# Patient Record
Sex: MALE | Race: WHITE | NOT HISPANIC OR LATINO | Employment: FULL TIME | URBAN - METROPOLITAN AREA
[De-identification: names, ages, dates, MRNs, and addresses within clinical notes are randomized per-mention and may not be internally consistent; named-entity substitution may affect disease eponyms.]

---

## 2017-05-05 RX ORDER — ALLOPURINOL 100 MG/1
100 TABLET ORAL DAILY
Qty: 90 TAB | Refills: 3 | Status: SHIPPED | OUTPATIENT
Start: 2017-05-05 | End: 2018-04-26 | Stop reason: SDUPTHER

## 2017-11-14 ENCOUNTER — OFFICE VISIT (OUTPATIENT)
Dept: MEDICAL GROUP | Facility: PHYSICIAN GROUP | Age: 57
End: 2017-11-14
Payer: COMMERCIAL

## 2017-11-14 VITALS
TEMPERATURE: 98.9 F | RESPIRATION RATE: 16 BRPM | HEIGHT: 67 IN | OXYGEN SATURATION: 96 % | DIASTOLIC BLOOD PRESSURE: 70 MMHG | BODY MASS INDEX: 34.37 KG/M2 | HEART RATE: 87 BPM | SYSTOLIC BLOOD PRESSURE: 116 MMHG | WEIGHT: 219 LBS

## 2017-11-14 DIAGNOSIS — Z23 NEED FOR VACCINATION: ICD-10-CM

## 2017-11-14 DIAGNOSIS — M1A.09X0 IDIOPATHIC CHRONIC GOUT OF MULTIPLE SITES WITHOUT TOPHUS: ICD-10-CM

## 2017-11-14 DIAGNOSIS — Z00.00 WELL ADULT EXAM: ICD-10-CM

## 2017-11-14 DIAGNOSIS — E66.9 OBESITY (BMI 30-39.9): ICD-10-CM

## 2017-11-14 PROCEDURE — 99396 PREV VISIT EST AGE 40-64: CPT | Mod: 25 | Performed by: FAMILY MEDICINE

## 2017-11-14 PROCEDURE — 90686 IIV4 VACC NO PRSV 0.5 ML IM: CPT | Performed by: FAMILY MEDICINE

## 2017-11-14 PROCEDURE — 90471 IMMUNIZATION ADMIN: CPT | Performed by: FAMILY MEDICINE

## 2017-11-14 NOTE — ASSESSMENT & PLAN NOTE
Ongoing issue. Patient continues on allopurinol 100 mg daily and as result has not had a gout attack in over one year.

## 2017-11-14 NOTE — ASSESSMENT & PLAN NOTE
Ongoing issue. Patient has worked diligently to eat healthy and exercise and has lost weight since his last visit. As a result he has improved his BMI to 34 from previous level of 41.

## 2017-11-14 NOTE — PROGRESS NOTES
"Subjective:   Sheng Joel is a 57 y.o. male here today for Annual physical, obesity, gout    Obesity (BMI 30-39.9)  Ongoing issue. Patient has worked diligently to eat healthy and exercise and has lost weight since his last visit. As a result he has improved his BMI to 34 from previous level of 41.    Chronic idiopathic gout of multiple sites  Ongoing issue. Patient continues on allopurinol 100 mg daily and as result has not had a gout attack in over one year.         Current medicines (including changes today)  Current Outpatient Prescriptions   Medication Sig Dispense Refill   • allopurinol (ZYLOPRIM) 100 MG Tab Take 1 Tab by mouth every day. 90 Tab 3   • Multiple Vitamins-Minerals (MULTI FOR HIM) Cap Take  by mouth.     • colchicine (COLCRYS) 0.6 MG Tab Take 1 Tab by mouth every day. 30 Tab 3     No current facility-administered medications for this visit.      He  has a past medical history of Gout and Pneumonia.    ROS   No chest pain, no shortness of breath, no abdominal pain       Objective:     Blood pressure 116/70, pulse 87, temperature 37.2 °C (98.9 °F), resp. rate 16, height 1.702 m (5' 7\"), weight 99.3 kg (219 lb), SpO2 96 %. Body mass index is 34.3 kg/m².   Physical Exam:  Alert, oriented in no acute distress.  Eye contact is good, speech goal directed, affect calm  HEENT: conjunctiva non-injected, sclera non-icteric.  Pinna normal. TM pearly gray.   Oral mucous membranes pink and moist with no lesions.  Neck No adenopathy or masses in the neck or supraclavicular regions.  Lungs: clear to auscultation bilaterally with good excursion.  CV: regular rate and rhythm.  Abdomen: soft, nontender, No CVAT  Ext: no edema, color normal, vascularity normal, temperature normal  Neuro: Cranial nerves II through XII grossly intact      Assessment and Plan:   The following treatment plan was discussed     1. Well adult exam  COMP METABOLIC PANEL    LIPID PROFILE    VITAMIN D,25 HYDROXY    No acute findings " on exam; chart reviewed and updated with the patient. Patient sent for age-appropriate screening labs; monitor for results   2. Obesity (BMI 30-39.9)      Improved. Continue healthy lifestyle; monitor   3. Idiopathic chronic gout of multiple sites without tophus      Stable. Continue current medications; monitor   4. Need for vaccination  Flu Quad Inj >3 Year Pre-Filled PF    Age appropriate immunization provided; patient tolerated procedure well.       Followup: Return in about 1 year (around 11/14/2018) for annual physical, Short.

## 2017-11-16 ENCOUNTER — HOSPITAL ENCOUNTER (OUTPATIENT)
Dept: LAB | Facility: MEDICAL CENTER | Age: 57
End: 2017-11-16
Attending: FAMILY MEDICINE
Payer: COMMERCIAL

## 2017-11-16 DIAGNOSIS — Z00.00 WELL ADULT EXAM: ICD-10-CM

## 2017-11-16 LAB
25(OH)D3 SERPL-MCNC: 29 NG/ML (ref 30–100)
ALBUMIN SERPL BCP-MCNC: 4.1 G/DL (ref 3.2–4.9)
ALBUMIN/GLOB SERPL: 1.5 G/DL
ALP SERPL-CCNC: 43 U/L (ref 30–99)
ALT SERPL-CCNC: 18 U/L (ref 2–50)
ANION GAP SERPL CALC-SCNC: 9 MMOL/L (ref 0–11.9)
AST SERPL-CCNC: 18 U/L (ref 12–45)
BILIRUB SERPL-MCNC: 0.8 MG/DL (ref 0.1–1.5)
BUN SERPL-MCNC: 15 MG/DL (ref 8–22)
CALCIUM SERPL-MCNC: 9.1 MG/DL (ref 8.5–10.5)
CHLORIDE SERPL-SCNC: 106 MMOL/L (ref 96–112)
CHOLEST SERPL-MCNC: 180 MG/DL (ref 100–199)
CO2 SERPL-SCNC: 24 MMOL/L (ref 20–33)
CREAT SERPL-MCNC: 1.07 MG/DL (ref 0.5–1.4)
GFR SERPL CREATININE-BSD FRML MDRD: >60 ML/MIN/1.73 M 2
GLOBULIN SER CALC-MCNC: 2.7 G/DL (ref 1.9–3.5)
GLUCOSE SERPL-MCNC: 86 MG/DL (ref 65–99)
HDLC SERPL-MCNC: 64 MG/DL
LDLC SERPL CALC-MCNC: 104 MG/DL
POTASSIUM SERPL-SCNC: 3.9 MMOL/L (ref 3.6–5.5)
PROT SERPL-MCNC: 6.8 G/DL (ref 6–8.2)
SODIUM SERPL-SCNC: 139 MMOL/L (ref 135–145)
TRIGL SERPL-MCNC: 62 MG/DL (ref 0–149)

## 2017-11-16 PROCEDURE — 80061 LIPID PANEL: CPT

## 2017-11-16 PROCEDURE — 80053 COMPREHEN METABOLIC PANEL: CPT

## 2017-11-16 PROCEDURE — 36415 COLL VENOUS BLD VENIPUNCTURE: CPT

## 2017-11-16 PROCEDURE — 82306 VITAMIN D 25 HYDROXY: CPT

## 2018-01-16 ENCOUNTER — TELEPHONE (OUTPATIENT)
Dept: MEDICAL GROUP | Facility: PHYSICIAN GROUP | Age: 58
End: 2018-01-16

## 2018-01-16 DIAGNOSIS — Z30.09 VASECTOMY EVALUATION: ICD-10-CM

## 2018-03-12 ENCOUNTER — TELEPHONE (OUTPATIENT)
Dept: MEDICAL GROUP | Facility: PHYSICIAN GROUP | Age: 58
End: 2018-03-12

## 2018-03-12 NOTE — TELEPHONE ENCOUNTER
VOICEMAIL  1. Caller Name: Sheng Joel                        Call Back Number: [phone] (home)       2. Message: patient requesting an order for Cialis for vacation has younger girlfriend he wants to keep up with her.    3. Patient approves office to leave a detailed voicemail/MyChart message: N\A

## 2018-03-13 ENCOUNTER — PATIENT MESSAGE (OUTPATIENT)
Dept: MEDICAL GROUP | Facility: PHYSICIAN GROUP | Age: 58
End: 2018-03-13

## 2018-03-13 RX ORDER — TADALAFIL 5 MG/1
5 TABLET ORAL PRN
Qty: 10 TAB | Refills: 3 | Status: SHIPPED | OUTPATIENT
Start: 2018-03-13 | End: 2018-05-05 | Stop reason: SDUPTHER

## 2018-04-26 RX ORDER — ALLOPURINOL 100 MG/1
TABLET ORAL
Qty: 90 TAB | Refills: 0 | Status: SHIPPED | OUTPATIENT
Start: 2018-04-26 | End: 2018-08-02 | Stop reason: SDUPTHER

## 2018-07-12 ENCOUNTER — TELEPHONE (OUTPATIENT)
Dept: MEDICAL GROUP | Facility: PHYSICIAN GROUP | Age: 58
End: 2018-07-12

## 2018-07-12 NOTE — TELEPHONE ENCOUNTER
MEDICATION PRIOR AUTHORIZATION NEEDED:    1. Name of Medication: CIALIS 5 MG tablet    2. Requested By (Name of Pharmacy): [facility]     3. Is insurance on file current? Yes    4. What is the name & phone number of the 3rd party payor?   Cover My Med  Key: [id number]  Pt Last Name: Wisam CHAPARROB 1960

## 2018-08-27 RX ORDER — TADALAFIL 5 MG
TABLET ORAL
Qty: 10 TAB | Refills: 3 | Status: SHIPPED | OUTPATIENT
Start: 2018-08-27 | End: 2018-12-03

## 2018-12-03 RX ORDER — TADALAFIL 5 MG/1
TABLET ORAL
Qty: 10 TAB | Refills: 0 | Status: SHIPPED | OUTPATIENT
Start: 2018-12-03 | End: 2019-01-03

## 2018-12-03 NOTE — TELEPHONE ENCOUNTER
Requested Prescriptions     Pending Prescriptions Disp Refills   • tadalafil (CIALIS) 5 MG tablet 10 Tab 0     Sig: TAKE 1 TABLET BY MOUTH ONCE DAILY AS NEEDED FOR  ERECTILE  DYSFUNCTION  *  MAX  OF  1  PER  DAY       ARAVIND Souza.

## 2019-01-03 ENCOUNTER — OFFICE VISIT (OUTPATIENT)
Dept: MEDICAL GROUP | Facility: PHYSICIAN GROUP | Age: 59
End: 2019-01-03
Payer: COMMERCIAL

## 2019-01-03 VITALS
HEART RATE: 83 BPM | TEMPERATURE: 97.9 F | SYSTOLIC BLOOD PRESSURE: 126 MMHG | OXYGEN SATURATION: 96 % | HEIGHT: 67 IN | DIASTOLIC BLOOD PRESSURE: 84 MMHG | WEIGHT: 262 LBS | BODY MASS INDEX: 41.12 KG/M2

## 2019-01-03 DIAGNOSIS — Z23 NEED FOR VACCINATION: ICD-10-CM

## 2019-01-03 DIAGNOSIS — E55.9 VITAMIN D DEFICIENCY: ICD-10-CM

## 2019-01-03 DIAGNOSIS — E66.01 CLASS 3 SEVERE OBESITY WITHOUT SERIOUS COMORBIDITY WITH BODY MASS INDEX (BMI) OF 40.0 TO 44.9 IN ADULT, UNSPECIFIED OBESITY TYPE (HCC): ICD-10-CM

## 2019-01-03 DIAGNOSIS — M1A.09X0 IDIOPATHIC CHRONIC GOUT OF MULTIPLE SITES WITHOUT TOPHUS: ICD-10-CM

## 2019-01-03 DIAGNOSIS — N52.8 OTHER MALE ERECTILE DYSFUNCTION: ICD-10-CM

## 2019-01-03 PROBLEM — E66.813 CLASS 3 SEVERE OBESITY WITHOUT SERIOUS COMORBIDITY WITH BODY MASS INDEX (BMI) OF 40.0 TO 44.9 IN ADULT (HCC): Status: ACTIVE | Noted: 2017-11-14

## 2019-01-03 PROCEDURE — 99214 OFFICE O/P EST MOD 30 MIN: CPT | Mod: 25 | Performed by: NURSE PRACTITIONER

## 2019-01-03 PROCEDURE — 90686 IIV4 VACC NO PRSV 0.5 ML IM: CPT | Performed by: NURSE PRACTITIONER

## 2019-01-03 PROCEDURE — 90471 IMMUNIZATION ADMIN: CPT | Performed by: NURSE PRACTITIONER

## 2019-01-03 PROCEDURE — 90472 IMMUNIZATION ADMIN EACH ADD: CPT | Performed by: NURSE PRACTITIONER

## 2019-01-03 PROCEDURE — 90715 TDAP VACCINE 7 YRS/> IM: CPT | Performed by: NURSE PRACTITIONER

## 2019-01-03 RX ORDER — SILDENAFIL CITRATE 20 MG/1
20-80 TABLET ORAL
Qty: 30 TAB | Refills: 3 | Status: SHIPPED | OUTPATIENT
Start: 2019-01-03 | End: 2019-10-22

## 2019-01-03 RX ORDER — SILDENAFIL CITRATE 20 MG/1
20 TABLET ORAL 3 TIMES DAILY
COMMUNITY
End: 2019-01-03

## 2019-01-03 ASSESSMENT — PATIENT HEALTH QUESTIONNAIRE - PHQ9: CLINICAL INTERPRETATION OF PHQ2 SCORE: 0

## 2019-01-03 NOTE — LETTER
[facility]  AYESHA SouzaREllieN.  [address]  [place] [address]  Fax: [phone]   Authorization for Release/Disclosure of   Protected Health Information   Name: SHENG MILLS : 1960 SSN: [id number]   Address: [address] [facility] [id number]  [place] [address] Phone:    [phone] (home)    I authorize the entity listed below to release/disclose the PHI below to:   [facility]/LUIS FERNANDO SouzaP.R.TERRIE. and ARAVIND Souza.   Provider or Entity Name:  [facility]   Address   City, State, Zip   Phone:      Fax:     Reason for request: continuity of care   Information to be released:    [X] LAST COLONOSCOPY,  including any PATH REPORT and follow-up  [  ] LAST FIT/COLOGUARD RESULT [  ] LAST DEXA  [  ] LAST MAMMOGRAM  [  ] LAST PAP  [  ] LAST LABS [  ] RETINA EXAM REPORT  [  ] IMMUNIZATION RECORDS  [  ] Release all info      [  ] Check here and initial the line next to each item to release ALL health information INCLUDING  _____ Care and treatment for drug and / or alcohol abuse  _____ HIV testing, infection status, or AIDS  _____ Genetic Testing    DATES OF SERVICE OR TIME PERIOD TO BE DISCLOSED: _____________  I understand and acknowledge that:  * This Authorization may be revoked at any time by you in writing, except if your health information has already been used or disclosed.  * Your health information that will be used or disclosed as a result of you signing this authorization could be re-disclosed by the recipient. If this occurs, your re-disclosed health information may no longer be protected by State or Federal laws.  * You may refuse to sign this Authorization. Your refusal will not affect your ability to obtain treatment.  * This Authorization becomes effective upon signing and will  on (date) __________.      If no date is indicated, this Authorization will  one (1) year from the signature date.    Name: Sheng Mnea  Wisam    Signature:   Date:     1/3/2019       PLEASE FAX REQUESTED RECORDS BACK TO: [phone]

## 2019-01-03 NOTE — ASSESSMENT & PLAN NOTE
This is a chronic problem for the patient that is uncontrolled.  The patient's last vitamin D level was 29 on 11/16/2017.  The patient is not taking any vitamin D supplements.

## 2019-01-03 NOTE — LETTER
[facility]  AYESHA SouzaREllieN.  [address]  [place] [address]  Fax: [phone]   Authorization for Release/Disclosure of   Protected Health Information   Name: SHENG JOEL : 1960 SSN: [id number]   Address: [address] [facility] [address]  [place] [address] Phone:    [phone] (home)    I authorize the entity listed below to release/disclose the PHI below to:   [facility]/LUIS FERNANDO SouzaP.R.TERRIE. and LEONELA Souza   Provider or Entity Name:  [facility]   Address   City, State, Zip   Phone:      Fax:     Reason for request: continuity of care   Information to be released:    [X] LAST COLONOSCOPY,  including any PATH REPORT and follow-up  [  ] LAST FIT/COLOGUARD RESULT [  ] LAST DEXA  [  ] LAST MAMMOGRAM  [  ] LAST PAP  [  ] LAST LABS [  ] RETINA EXAM REPORT  [  ] IMMUNIZATION RECORDS  [  ] Release all info      [  ] Check here and initial the line next to each item to release ALL health information INCLUDING  _____ Care and treatment for drug and / or alcohol abuse  _____ HIV testing, infection status, or AIDS  _____ Genetic Testing    DATES OF SERVICE OR TIME PERIOD TO BE DISCLOSED: _____________  I understand and acknowledge that:  * This Authorization may be revoked at any time by you in writing, except if your health information has already been used or disclosed.  * Your health information that will be used or disclosed as a result of you signing this authorization could be re-disclosed by the recipient. If this occurs, your re-disclosed health information may no longer be protected by State or Federal laws.  * You may refuse to sign this Authorization. Your refusal will not affect your ability to obtain treatment.  * This Authorization becomes effective upon signing and will  on (date) __________.      If no date is indicated, this Authorization will  one (1) year from the signature date.    Name: Sheng Joel    Signature:   Date:          1/3/2019       PLEASE FAX REQUESTED RECORDS BACK TO: [phone]

## 2019-01-03 NOTE — PROGRESS NOTES
"CC:   Chief Complaint   Patient presents with   • Establish Care   • Gout   • Erectile Dysfunction       HISTORY OF THE PRESENT ILLNESS: Patient is a 58 y.o. male. This pleasant patient is here today to establish care and discuss multiple issues as listed below.    Health Maintenance: Reviewed, patient reports that he had a colonoscopy at age 53 and was recommended to return in 10 years, records requested.  Tdap and influenza updated today.    Other male erectile dysfunction  This is a chronic problem for the patient that is managed with medication.  The patient reports that he has a new girlfriend that is 19 years old and lives in the [facility].  The patient has been traveling to see her for a few weeks at a time every couple months.  Patient reports that he is extremely sexually active with his new girlfriend and requires the medication \"to keep up\".  Patient had previously been taking Cialis but due to cost of $128 for 10 pills the patient is requesting a change to sildenafil.  The patient denies any side effects while taking medications.  The patient denies chest pain, shortness of breath, headaches, dizziness, blurry vision with medication use.  The patient's blood pressure today is 126/84 with heart rate of 83.    Class 3 severe obesity without serious comorbidity with body mass index (BMI) of 40.0 to 44.9 in adult (HCC)  This is a chronic problem for the patient that is uncontrolled.  The patient's weight today is 262 pounds with a BMI of 41.04.  Patient reports that one year ago he had his weight down to 212 pounds.  Over the last year he has been \"lazy\" in regards to his diet and exercise.  The patient has cut back on his alcohol intake as he had a DUI 6 months ago and is on probation for this.  The patient is planning another trip to the [facility] to see his girlfriend in February 2019 and plans to start eating healthy and return to the gym to use the treadmill in hopes of losing some weight before " this vacation.    Chronic idiopathic gout of multiple sites  This is a chronic problem for the patient that is controlled with allopurinol 100 mg/day.  The patient reports that before starting this medication he was having gout flares monthly.  Since starting the medication he has had 2 flares in the last 2 years.  Patient does have a prescription for colchicine as needed, but has not needed it.  Patient also reports that he went to [place] and bought an over-the-counter medication for gout that he keeps on hand in case of a flare, picture of medication appears to be indomethacin.    Vitamin D deficiency  This is a chronic problem for the patient that is uncontrolled.  The patient's last vitamin D level was 29 on 11/16/2017.  The patient is not taking any vitamin D supplements.    Allergies: Patient has no known allergies.    Current Outpatient Prescriptions Ordered in [facility]   Medication Sig Dispense Refill   • sildenafil (REVATIO) 20 MG tablet Take 1-4 Tabs by mouth 1 time daily as needed. Max daily dose 80mg. 30 Tab 3   • allopurinol (ZYLOPRIM) 100 MG Tab TAKE 1 TABLET BY MOUTH ONCE DAILY 90 Tab 3   • colchicine (COLCRYS) 0.6 MG Tab TAKE ONE TABLET BY MOUTH ONCE DAILY 90 Tab 3   • Multiple Vitamins-Minerals (MULTI FOR HIM) Cap Take  by mouth.       No current Epic-ordered facility-administered medications on file.        Past Medical History:   Diagnosis Date   • Gout    • Obesity, morbid, BMI 40.0-49.9 ([facility])    • Pneumonia     was hospitalized for this       Past Surgical History:   Procedure Laterality Date   • VASECTOMY  2018   • HERNIA REPAIR  1963       Social History   Substance Use Topics   • Smoking status: Never Smoker   • Smokeless tobacco: Former User     Types: Chew     Quit date: 1/3/2012   • Alcohol use 0.0 oz/week      Comment: twice weekly - recent DUI       Social History     Social History Narrative   • No narrative on file       Family History   Problem Relation Age of Onset   • Breast Cancer  "Mother    • Heart Disease Father    • Hypertension Father    • Kidney Disease Father    • Obesity Sister    • No Known Problems Brother    • No Known Problems Maternal Grandmother    • Heart Attack Maternal Grandfather 45   • No Known Problems Paternal Grandmother    • Stroke Paternal Grandfather        ROS:   Constitutional:  Negative for fever, chills, unexpected weight change, night sweats, body aches, sleep issues,  and fatigue/generalized weakness.   HEENT:  Negative for headaches, vision changes, hearing changes, ear pain, tinnitus, ear discharge, rhinorrhea, sinus congestion, sneezing, sore throat, and neck pain.    Respiratory:  Negative for cough, shortness of breath, sputum production, hemoptysis, chest congestion, dyspnea, wheezing, and crackles.    Cardiovascular:  Negative for chest pain, palpitations, HOFFMAN, paroxsymal nocturnal dyspnea, orthopnea, and bilateral lower extremity edema.   Gastrointestinal:  Negative for heartburn, nausea, vomiting, abdominal pain, hematochezia, melena, diarrhea, constipation, and greasy/foul-smelling stools.   Genitourinary:  Negative for dysuria, nocturia, polyuria, hematuria, pyuria, urinary urgency, urinary frequency, and urinary incontinence.   Musculoskeletal:  Negative for myalgias, back pain, and joint pain.   Skin:  Negative for rash, sores, lumps, itching, cyanotic skin color change.   Neurological:  Negative for dizziness, tingling, tremors, focal sensory deficit, focal weakness and headaches.   Endo/Heme/Allergies:  Does not bruise/bleed easily. Denies cold/heat intolerance.   Psychiatric/Behavioral: Negative for depression, suicidal/homicidal ideation and memory loss.        Exam: Blood pressure 126/84, pulse 83, temperature 36.6 °C (97.9 °F), temperature source Temporal, height 1.702 m (5' 7\"), weight 118.8 kg (262 lb), SpO2 96 %. Body mass index is 41.04 kg/m².    General:  Normal appearing. No distress.  HEENT:  Normocephalic. Eyes conjunctiva clear lids " "without ptosis, pupils equal and reactive to light accommodation, ears normal shape and contour.   Pulmonary:  Clear to ausculation.  Normal effort. No rales, ronchi, or wheezing.  Cardiovascular:  Regular rate and rhythm without murmur. Carotid and radial pulses are intact and equal bilaterally.  Abdomen: Obese, soft, nontender, nondistended. Normal bowel sounds. Liver and spleen are not palpable.  Neurologic: Grossly nonfocal.  Skin:  Warm and dry.  No obvious lesions.  Musculoskeletal:  Normal gait. No extremity cyanosis, clubbing, or edema.  Psych:  Normal mood and affect. Alert and oriented x3. Judgment and insight is normal.    Assessment/Plan  1. Other male erectile dysfunction  This is an ongoing problem for the patient.  The patient has a new and \"younger girlfriend\" that lives in the [facility] who he sees for a couple weeks every couple months.  The patient reports that he is very sexually active with his new partner and due to her age needs help with \"stamina\".  The patient was previously taking Cialis for this issue but due to cost is requesting to change to sildenafil.  Sildenafil prescription sent to [place] pharmacy.  - sildenafil (REVATIO) 20 MG tablet; Take 1-4 Tabs by mouth 1 time daily as needed. Max daily dose 80mg.  Dispense: 30 Tab; Refill: 3    2. Class 3 severe obesity without serious comorbidity with body mass index (BMI) of 40.0 to 44.9 in adult, unspecified obesity type (HCC)  This is a chronic problem for the patient that is worsening.  The patient has gained approximately 50 pounds in the last year.  Patient reports that his diet is poor and he is not participating in any physical exercise.  We will plan to check a CMP, hemoglobin A1c, lipid profile and follow-up based on results.  Patient reports that he does plan to start eating healthier and return to the gym to use the treadmill as he knows he needs to get in better shape for his next trip to the [facility] to see his girlfriend in " February 2019.  Declines referral to the [facility] at this time.  - Patient identified as having weight management issue.  Appropriate orders and counseling given.  - COMP METABOLIC PANEL; Future  - HEMOGLOBIN A1C; Future  - Lipid Profile; Future    3. Vitamin D deficiency  This is a chronic problem for the patient.  Plan to check vitamin D levels and follow-up based on results.  - VITAMIN D,25 HYDROXY; Future    4. Need for vaccination  Given today.  - Tdap =>8yo IM  - Flu Quad Inj >3 Year Pre-Filled PF    5. Idiopathic chronic gout of multiple sites without tophus  This is a chronic condition that is controlled with daily allopurinol 100 mg/day.  Patient does not need a refill at this time.  Patient also has as needed colchicine ordered for gout flares, patient does not need a refill of this medication either.    Educated in proper administration of medication(s) ordered today including safety, possible SE, risks, benefits, rationale and alternatives to therapy.   Supportive care, differential diagnoses, and indications for immediate follow-up discussed with patient.    Pathogenesis of diagnosis discussed including typical length and natural progression.    Instructed to return to clinic or nearest emergency department for any change in condition, further concerns, or worsening of symptoms.  Patient states understanding of the plan of care and discharge instructions.    Consent for records release signed to order medical records from [facility], [facility] for medical records.    Return in about 6 months (around 7/3/2019) for Follow up Labs, As needed.    I have placed the below orders and discussed them with an approved delegating provider. The MA is performing the below orders under the direction of Dr. Del Toro.    Please note that this dictation was created using voice recognition software. I have made every reasonable attempt  to correct obvious errors, but I expect that there are errors of grammar and possibly content that I did not discover before finalizing the note.

## 2019-01-03 NOTE — ASSESSMENT & PLAN NOTE
"This is a chronic problem for the patient that is managed with medication.  The patient reports that he has a new girlfriend that is 19 years old and lives in the [facility].  The patient has been traveling to see her for a few weeks at a time every couple months.  Patient reports that he is extremely sexually active with his new girlfriend and requires the medication \"to keep up\".  Patient had previously been taking Cialis but due to cost of $128 for 10 pills the patient is requesting a change to sildenafil.  The patient denies any side effects while taking medications.  The patient denies chest pain, shortness of breath, headaches, dizziness, blurry vision with medication use.  The patient's blood pressure today is 126/84 with heart rate of 83.  "

## 2019-01-03 NOTE — LETTER
[facility]  AYESHA SouzaREllieN.  [address]  [place] [address]  Fax: [phone]   Authorization for Release/Disclosure of   Protected Health Information   Name: SHENG JOEL : 1960 SSN: [id number]   Address: [address] [facility] [address]  [place] [address] Phone:    [phone] (home)    I authorize the entity listed below to release/disclose the PHI below to:   [facility]/LUIS FERNANDO SouzaP.RYAO. and LEONELA Souza   Provider or Entity Name:  [facility]   Address   City, State, [facility]   Phone:      Fax:     Reason for request: continuity of care   Information to be released:    [  ] LAST COLONOSCOPY,  including any PATH REPORT and follow-up  [  ] LAST FIT/COLOGUARD RESULT [  ] LAST DEXA  [  ] LAST MAMMOGRAM  [  ] LAST PAP  [  ] LAST LABS [  ] RETINA EXAM REPORT  [  ] IMMUNIZATION RECORDS  [X] Release all info      [  ] Check here and initial the line next to each item to release ALL health information INCLUDING  _____ Care and treatment for drug and / or alcohol abuse  _____ HIV testing, infection status, or AIDS  _____ Genetic Testing    DATES OF SERVICE OR TIME PERIOD TO BE DISCLOSED: _____________  I understand and acknowledge that:  * This Authorization may be revoked at any time by you in writing, except if your health information has already been used or disclosed.  * Your health information that will be used or disclosed as a result of you signing this authorization could be re-disclosed by the recipient. If this occurs, your re-disclosed health information may no longer be protected by State or Federal laws.  * You may refuse to sign this Authorization. Your refusal will not affect your ability to obtain treatment.  * This Authorization becomes effective upon signing and will  on (date) __________.      If no date is indicated, this Authorization will  one (1) year from the signature date.    Name: Sheng Joel    Signature:   Date:     1/3/2019       PLEASE FAX REQUESTED RECORDS BACK TO: [phone]

## 2019-01-03 NOTE — ASSESSMENT & PLAN NOTE
This is a chronic problem for the patient that is controlled with allopurinol 100 mg/day.  The patient reports that before starting this medication he was having gout flares monthly.  Since starting the medication he has had 2 flares in the last 2 years.  Patient does have a prescription for colchicine as needed, but has not needed it.  Patient also reports that he went to [place] and bought an over-the-counter medication for gout that he keeps on hand in case of a flare, picture of medication appears to be indomethacin.

## 2019-01-04 ENCOUNTER — HOSPITAL ENCOUNTER (OUTPATIENT)
Dept: LAB | Facility: MEDICAL CENTER | Age: 59
End: 2019-01-04
Attending: NURSE PRACTITIONER
Payer: COMMERCIAL

## 2019-01-04 DIAGNOSIS — E66.01 CLASS 3 SEVERE OBESITY WITHOUT SERIOUS COMORBIDITY WITH BODY MASS INDEX (BMI) OF 40.0 TO 44.9 IN ADULT, UNSPECIFIED OBESITY TYPE (HCC): ICD-10-CM

## 2019-01-04 DIAGNOSIS — E55.9 VITAMIN D DEFICIENCY: ICD-10-CM

## 2019-01-04 LAB
25(OH)D3 SERPL-MCNC: 14 NG/ML (ref 30–100)
ALBUMIN SERPL BCP-MCNC: 4.3 G/DL (ref 3.2–4.9)
ALBUMIN/GLOB SERPL: 1.7 G/DL
ALP SERPL-CCNC: 47 U/L (ref 30–99)
ALT SERPL-CCNC: 19 U/L (ref 2–50)
ANION GAP SERPL CALC-SCNC: 9 MMOL/L (ref 0–11.9)
AST SERPL-CCNC: 15 U/L (ref 12–45)
BILIRUB SERPL-MCNC: 0.5 MG/DL (ref 0.1–1.5)
BUN SERPL-MCNC: 26 MG/DL (ref 8–22)
CALCIUM SERPL-MCNC: 9.3 MG/DL (ref 8.5–10.5)
CHLORIDE SERPL-SCNC: 108 MMOL/L (ref 96–112)
CHOLEST SERPL-MCNC: 202 MG/DL (ref 100–199)
CO2 SERPL-SCNC: 24 MMOL/L (ref 20–33)
CREAT SERPL-MCNC: 1.06 MG/DL (ref 0.5–1.4)
FASTING STATUS PATIENT QL REPORTED: NORMAL
GLOBULIN SER CALC-MCNC: 2.5 G/DL (ref 1.9–3.5)
GLUCOSE SERPL-MCNC: 97 MG/DL (ref 65–99)
HDLC SERPL-MCNC: 42 MG/DL
LDLC SERPL CALC-MCNC: 135 MG/DL
POTASSIUM SERPL-SCNC: 4.1 MMOL/L (ref 3.6–5.5)
PROT SERPL-MCNC: 6.8 G/DL (ref 6–8.2)
SODIUM SERPL-SCNC: 141 MMOL/L (ref 135–145)
TRIGL SERPL-MCNC: 125 MG/DL (ref 0–149)

## 2019-01-04 PROCEDURE — 82306 VITAMIN D 25 HYDROXY: CPT

## 2019-01-04 PROCEDURE — 80061 LIPID PANEL: CPT

## 2019-01-04 PROCEDURE — 80053 COMPREHEN METABOLIC PANEL: CPT

## 2019-01-04 PROCEDURE — 36415 COLL VENOUS BLD VENIPUNCTURE: CPT

## 2019-01-04 PROCEDURE — 83036 HEMOGLOBIN GLYCOSYLATED A1C: CPT

## 2019-01-05 DIAGNOSIS — R73.09 ELEVATED HEMOGLOBIN A1C: ICD-10-CM

## 2019-01-05 DIAGNOSIS — E55.9 VITAMIN D DEFICIENCY: ICD-10-CM

## 2019-01-05 DIAGNOSIS — E78.2 MIXED HYPERLIPIDEMIA: ICD-10-CM

## 2019-01-05 DIAGNOSIS — E66.01 CLASS 3 SEVERE OBESITY WITHOUT SERIOUS COMORBIDITY WITH BODY MASS INDEX (BMI) OF 40.0 TO 44.9 IN ADULT, UNSPECIFIED OBESITY TYPE (HCC): ICD-10-CM

## 2019-01-05 LAB
EST. AVERAGE GLUCOSE BLD GHB EST-MCNC: 117 MG/DL
HBA1C MFR BLD: 5.7 % (ref 0–5.6)

## 2019-01-29 ENCOUNTER — TELEPHONE (OUTPATIENT)
Dept: MEDICAL GROUP | Facility: PHYSICIAN GROUP | Age: 59
End: 2019-01-29

## 2019-01-29 NOTE — TELEPHONE ENCOUNTER
1. Caller Name: Sheng Joel                                           Call Back Number: [phone] (home)       Patient approves a detailed voicemail message: yes    Sheng said in the past with his previous PCP he has taken Phentermine to help him out in the beginning of his diet. He said it was extremely successful. He would like to know if you could start him on it again for a couple of months.     Let me know what you think

## 2019-01-31 ENCOUNTER — TELEPHONE (OUTPATIENT)
Dept: MEDICAL GROUP | Facility: PHYSICIAN GROUP | Age: 59
End: 2019-01-31

## 2019-01-31 ENCOUNTER — OFFICE VISIT (OUTPATIENT)
Dept: MEDICAL GROUP | Facility: PHYSICIAN GROUP | Age: 59
End: 2019-01-31
Payer: COMMERCIAL

## 2019-01-31 VITALS
TEMPERATURE: 97.7 F | HEIGHT: 67 IN | BODY MASS INDEX: 40.18 KG/M2 | OXYGEN SATURATION: 98 % | HEART RATE: 79 BPM | WEIGHT: 256 LBS | SYSTOLIC BLOOD PRESSURE: 136 MMHG | DIASTOLIC BLOOD PRESSURE: 84 MMHG

## 2019-01-31 DIAGNOSIS — E66.01 CLASS 3 SEVERE OBESITY WITHOUT SERIOUS COMORBIDITY WITH BODY MASS INDEX (BMI) OF 40.0 TO 44.9 IN ADULT, UNSPECIFIED OBESITY TYPE (HCC): ICD-10-CM

## 2019-01-31 PROCEDURE — 99213 OFFICE O/P EST LOW 20 MIN: CPT | Performed by: NURSE PRACTITIONER

## 2019-01-31 RX ORDER — PHENTERMINE HYDROCHLORIDE 37.5 MG/1
37.5 CAPSULE ORAL EVERY MORNING
Qty: 30 CAP | Refills: 0 | Status: SHIPPED
Start: 2019-01-31 | End: 2019-03-02

## 2019-01-31 NOTE — ASSESSMENT & PLAN NOTE
"This is a chronic problem for the patient that is uncontrolled but improving.  The patient's weight today is 256 pounds with a BMI of 40.1.  On 1/3/2019 the patient's weight was 262 pounds with a BMI of 41.04.  Patient states that one year ago he was able to get his weight down to 212 pounds, but unfortunately gained it back over the last year.  The patient has recently restarted working out, 2 weeks ago, and is working out daily for 1-2 hours/day.  The patient is working on crunches, push-ups, and brisk walking on the treadmill at an incline.  The patient has been working hard on improving his diet, decreasing red meat and fatty foods.  The patient presents today requesting a prescription for phentermine.  The patient was on phentermine in 2016 to help with appetite suppression while working on weight loss.  The patient denied any significant side effects while taking the medication.  Patient did note that occasionally he would \"get edgy\" and if this happened he would stop taking the medication for a few days.  The patient has a vacation planned at the end of February to visit his girlfriend in the [facility] for 1 month and is highly motivated to work on weight loss, healthy diet, and increasing exercise. Patient understands that this medication is a controlled substance and will need to be seen for future refills.    Obtained and reviewed the patient utilization report state pharmacy database on 1/31/2019.  Based on assessment of report prescription for phentermine is medically necessary.  "

## 2019-01-31 NOTE — PROGRESS NOTES
"CC:   Chief Complaint   Patient presents with   • Obesity     wants to try a weight loss medication        HISTORY OF THE PRESENT ILLNESS: Patient is a 59 y.o. male. This pleasant patient is here today to discuss obesity and weight loss medication.    Health Maintenance: Completed    Class 3 severe obesity without serious comorbidity with body mass index (BMI) of 40.0 to 44.9 in adult (HCC)  This is a chronic problem for the patient that is uncontrolled but improving.  The patient's weight today is 256 pounds with a BMI of 40.1.  On 1/3/2019 the patient's weight was 262 pounds with a BMI of 41.04.  Patient states that one year ago he was able to get his weight down to 212 pounds, but unfortunately gained it back over the last year.  The patient has recently restarted working out, 2 weeks ago, and is working out daily for 1-2 hours/day.  The patient is working on crunches, push-ups, and brisk walking on the treadmill at an incline.  The patient has been working hard on improving his diet, decreasing red meat and fatty foods.  The patient presents today requesting a prescription for phentermine.  The patient was on phentermine in 2016 to help with appetite suppression while working on weight loss.  The patient denied any significant side effects while taking the medication.  Patient did note that occasionally he would \"get edgy\" and if this happened he would stop taking the medication for a few days.  The patient has a vacation planned at the end of February to visit his girlfriend in the [facility] for 1 month and is highly motivated to work on weight loss, healthy diet, and increasing exercise. Patient understands that this medication is a controlled substance and will need to be seen for future refills.    Obtained and reviewed the patient utilization report state pharmacy database on 1/31/2019.  Based on assessment of report prescription for phentermine is medically necessary.    Allergies: Patient has no known " allergies.    Current Outpatient Prescriptions Ordered in [facility]   Medication Sig Dispense Refill   • phentermine 37.5 MG capsule Take 1 Cap by mouth every morning for 30 days. 30 Cap 0   • allopurinol (ZYLOPRIM) 100 MG Tab TAKE 1 TABLET BY MOUTH ONCE DAILY 90 Tab 3   • colchicine (COLCRYS) 0.6 MG Tab TAKE ONE TABLET BY MOUTH ONCE DAILY 90 Tab 3   • Multiple Vitamins-Minerals (MULTI FOR HIM) Cap Take  by mouth.     • sildenafil (REVATIO) 20 MG tablet Take 1-4 Tabs by mouth 1 time daily as needed. Max daily dose 80mg. 30 Tab 3     No current Epic-ordered facility-administered medications on file.        Past Medical History:   Diagnosis Date   • Gout    • Obesity, morbid, BMI 40.0-49.9 (HCC)    • Pneumonia     was hospitalized for this       Past Surgical History:   Procedure Laterality Date   • VASECTOMY  2018   • HERNIA REPAIR  1963       Social History   Substance Use Topics   • Smoking status: Current Some Day Smoker     Types: Cigars   • Smokeless tobacco: Former User     Types: Chew     Quit date: 1/3/2012   • Alcohol use 0.0 oz/week      Comment: twice weekly - recent DUI       Social History     Social History Narrative   • No narrative on file       Family History   Problem Relation Age of Onset   • Breast Cancer Mother    • Heart Disease Father    • Hypertension Father    • Kidney Disease Father    • Obesity Sister    • No Known Problems Brother    • No Known Problems Maternal Grandmother    • Heart Attack Maternal Grandfather 45   • No Known Problems Paternal Grandmother    • Stroke Paternal Grandfather        ROS:   Respiratory:  Negative for cough, shortness of breath, sputum production, hemoptysis, chest congestion, dyspnea, wheezing, and crackles.    Cardiovascular:  Negative for chest pain, palpitations, HOFFMAN, paroxsymal nocturnal dyspnea, orthopnea, and bilateral lower extremity edema.   Gastrointestinal:  Negative for heartburn, nausea, vomiting, abdominal pain, hematochezia, melena, diarrhea,  "constipation, and greasy/foul-smelling stools.   Musculoskeletal:  Negative for myalgias, back pain, and joint pain.   Neurological:  Negative for dizziness, tingling, tremors, focal sensory deficit, focal weakness and headaches.   Psychiatric/Behavioral: Negative for depression, suicidal/homicidal ideation and memory loss.        Exam: Blood pressure 136/84, pulse 79, temperature 36.5 °C (97.7 °F), temperature source Temporal, height 1.702 m (5' 7\"), weight 116.1 kg (256 lb), SpO2 98 %. Body mass index is 40.1 kg/m².    General:  Normal appearing. No distress.  Neck:  Supple without JVD or bruit.  Pulmonary:  Clear to ausculation.  Normal effort. No rales, ronchi, or wheezing.  Cardiovascular:  Regular rate and rhythm without murmur. Carotid and radial pulses are intact and equal bilaterally.  Abdomen: obese, Soft, nontender, nondistended. Normal bowel sounds. Liver and spleen are not palpable.  Neurologic:  Grossly nonfocal.  Musculoskeletal:  Normal gait. No extremity cyanosis, clubbing, or edema.  Psych:  Normal mood and affect. Alert and oriented x3. Judgment and insight is normal.    Assessment/Plan  1. Class 3 severe obesity without serious comorbidity with body mass index (BMI) of 40.0 to 44.9 in adult, unspecified obesity type (HCC)  phentermine 37.5 MG capsule       Educated in proper administration of medication(s) ordered today including safety, possible SE, risks, benefits, rationale and alternatives to therapy.   Supportive care, differential diagnoses, and indications for immediate follow-up discussed with patient.    Pathogenesis of diagnosis discussed including typical length and natural progression.    Instructed to return to clinic or nearest emergency department for any change in condition, further concerns, or worsening of symptoms.  Patient states understanding of the plan of care and discharge instructions.    Return for As needed.    Please note that this dictation was created using voice " recognition software. I have made every reasonable attempt to correct obvious errors, but I expect that there are errors of grammar and possibly content that I did not discover before finalizing the note.

## 2019-05-28 RX ORDER — ALLOPURINOL 100 MG/1
TABLET ORAL
Qty: 90 TAB | Refills: 3 | Status: SHIPPED | OUTPATIENT
Start: 2019-05-28 | End: 2020-07-13 | Stop reason: SDUPTHER

## 2019-05-29 NOTE — TELEPHONE ENCOUNTER
Requested Prescriptions     Pending Prescriptions Disp Refills   • allopurinol (ZYLOPRIM) 100 MG Tab [Pharmacy Med Name: ALLOPURINOL 100MG TAB] 90 Tab 3     Sig: TAKE 1 TABLET BY MOUTH ONCE DAILY       ARAVIND Souza.

## 2019-10-21 DIAGNOSIS — N52.8 OTHER MALE ERECTILE DYSFUNCTION: ICD-10-CM

## 2019-10-21 NOTE — TELEPHONE ENCOUNTER
Was the patient seen in the last year in this department? Yes 1/31/19    Does patient have an active prescription for medications requested? No     Received Request Via: Pharmacy

## 2019-10-22 RX ORDER — TADALAFIL 5 MG/1
TABLET ORAL
Qty: 10 TAB | Refills: 3 | Status: SHIPPED | OUTPATIENT
Start: 2019-10-22 | End: 2019-12-08 | Stop reason: SDUPTHER

## 2019-10-22 NOTE — TELEPHONE ENCOUNTER
Requested Prescriptions     Pending Prescriptions Disp Refills   • tadalafil (CIALIS) 5 MG tablet [Pharmacy Med Name: TADALAFIL 5MG       TAB] 10 Tab 3     Sig: TAKE 1 TABLET BY MOUTH ONCE DAILY AS NEEDED FOR ERECTILE DYSFUNCTION. MAX OF 1 TAB PER DAY       ARAVIND Souza.

## 2019-12-08 DIAGNOSIS — N52.8 OTHER MALE ERECTILE DYSFUNCTION: ICD-10-CM

## 2019-12-09 RX ORDER — TADALAFIL 5 MG/1
TABLET ORAL
Qty: 10 TAB | Refills: 3 | Status: SHIPPED | OUTPATIENT
Start: 2019-12-09 | End: 2020-05-12

## 2020-05-12 DIAGNOSIS — N52.8 OTHER MALE ERECTILE DYSFUNCTION: ICD-10-CM

## 2020-05-12 RX ORDER — TADALAFIL 5 MG/1
TABLET ORAL
Qty: 10 TAB | Refills: 0 | Status: SHIPPED | OUTPATIENT
Start: 2020-05-12 | End: 2021-01-18 | Stop reason: SDUPTHER

## 2020-05-12 NOTE — TELEPHONE ENCOUNTER
Requested Prescriptions     Pending Prescriptions Disp Refills   • tadalafil (CIALIS) 5 MG tablet [Pharmacy Med Name: Tadalafil 5 MG Oral Tablet] 10 Tab 0     Sig: TAKE 1 TABLET BY MOUTH ONCE DAILY AS NEEDED FOR ERECTILE DYSFUNCTION (MAX OF 1 TABLET PER DAY)       ARAVIND Souza.

## 2020-05-12 NOTE — TELEPHONE ENCOUNTER
Received request via: Pharmacy    Was the patient seen in the last year in this department? Yes 1/31/19    Does the patient have an active prescription (recently filled or refills available) for medication(s) requested? No

## 2020-07-13 DIAGNOSIS — M1A.09X0 IDIOPATHIC CHRONIC GOUT OF MULTIPLE SITES WITHOUT TOPHUS: ICD-10-CM

## 2020-07-13 RX ORDER — ALLOPURINOL 100 MG/1
TABLET ORAL
Qty: 90 TAB | Refills: 0 | Status: SHIPPED | OUTPATIENT
Start: 2020-07-13 | End: 2020-11-16

## 2020-07-13 RX ORDER — ALLOPURINOL 100 MG/1
TABLET ORAL
Qty: 90 TAB | Refills: 0 | OUTPATIENT
Start: 2020-07-13

## 2020-07-13 NOTE — TELEPHONE ENCOUNTER
Requested Prescriptions     Pending Prescriptions Disp Refills   • allopurinol (ZYLOPRIM) 100 MG Tab 90 Tab 0     Sig: TAKE 1 TABLET BY MOUTH ONCE DAILY       ARAVIND Souza.

## 2020-11-16 DIAGNOSIS — M1A.09X0 IDIOPATHIC CHRONIC GOUT OF MULTIPLE SITES WITHOUT TOPHUS: ICD-10-CM

## 2020-11-16 RX ORDER — ALLOPURINOL 100 MG/1
100 TABLET ORAL DAILY
Qty: 30 TAB | Refills: 0 | Status: SHIPPED | OUTPATIENT
Start: 2020-11-16 | End: 2021-01-18 | Stop reason: SDUPTHER

## 2020-11-16 NOTE — TELEPHONE ENCOUNTER
Received request via: Pharmacy    Was the patient seen in the last year in this department? Yes 9/3/20    Does the patient have an active prescription (recently filled or refills available) for medication(s) requested? No

## 2020-11-16 NOTE — TELEPHONE ENCOUNTER
Requested Prescriptions     Pending Prescriptions Disp Refills   • allopurinol (ZYLOPRIM) 100 MG Tab [Pharmacy Med Name: Allopurinol 100 MG Oral Tablet] 30 Tab 0     Sig: Take 1 Tab by mouth every day. APPOINTMENT REQUIRED FOR FUTURE REFILLS       ARAVIND Souza.

## 2021-01-18 ENCOUNTER — TELEPHONE (OUTPATIENT)
Dept: MEDICAL GROUP | Facility: PHYSICIAN GROUP | Age: 61
End: 2021-01-18

## 2021-01-18 DIAGNOSIS — E66.01 CLASS 3 SEVERE OBESITY WITHOUT SERIOUS COMORBIDITY WITH BODY MASS INDEX (BMI) OF 40.0 TO 44.9 IN ADULT, UNSPECIFIED OBESITY TYPE (HCC): ICD-10-CM

## 2021-01-18 DIAGNOSIS — M1A.09X0 IDIOPATHIC CHRONIC GOUT OF MULTIPLE SITES WITHOUT TOPHUS: ICD-10-CM

## 2021-01-18 DIAGNOSIS — R73.09 ELEVATED HEMOGLOBIN A1C: ICD-10-CM

## 2021-01-18 DIAGNOSIS — Z00.00 ROUTINE HEALTH MAINTENANCE: ICD-10-CM

## 2021-01-18 DIAGNOSIS — N52.8 OTHER MALE ERECTILE DYSFUNCTION: ICD-10-CM

## 2021-01-18 DIAGNOSIS — E55.9 VITAMIN D DEFICIENCY: ICD-10-CM

## 2021-01-18 DIAGNOSIS — Z12.12 SCREENING FOR COLORECTAL CANCER: ICD-10-CM

## 2021-01-18 DIAGNOSIS — E78.2 MIXED HYPERLIPIDEMIA: ICD-10-CM

## 2021-01-18 DIAGNOSIS — Z12.11 SCREENING FOR COLORECTAL CANCER: ICD-10-CM

## 2021-01-18 RX ORDER — TADALAFIL 5 MG/1
TABLET ORAL
Qty: 10 TAB | Refills: 0 | Status: SHIPPED | OUTPATIENT
Start: 2021-01-18 | End: 2022-02-28 | Stop reason: SDUPTHER

## 2021-01-18 RX ORDER — ALLOPURINOL 100 MG/1
100 TABLET ORAL DAILY
Qty: 30 TAB | Refills: 0 | Status: SHIPPED | OUTPATIENT
Start: 2021-01-18 | End: 2021-02-22 | Stop reason: SDUPTHER

## 2021-01-18 NOTE — TELEPHONE ENCOUNTER
1. Caller Name: Sheng Joel                          Call Back Number: [phone] (home)         How would the patient prefer to be contacted with a response: Phone call OK to leave a detailed message    2. SPECIFIC Action To Be Taken: Referral pending, please sign.    3. Diagnosis/Clinical Reason for Request: Annual physical    4. Specialty & Provider Name/Lab[facility]aging [facility]    5. Is appointment scheduled for requested order/referral: no    Patient was informed they will receive a return phone call from the office ONLY if there are any questions before processing their request. Advised to call back if they haven't received a call from the referral department in 5 days.

## 2021-01-18 NOTE — TELEPHONE ENCOUNTER
Referral to GI for colonoscopy placed.  Also placed annual fasting labs.  Please schedule patient for in clinic annual exam and lab review.

## 2021-01-18 NOTE — TELEPHONE ENCOUNTER
Received request via: Patient    Was the patient seen in the last year in this department? No 1/31/19    Does the patient have an active prescription (recently filled or refills available) for medication(s) requested? No

## 2021-01-18 NOTE — PROGRESS NOTES
1. Screening for colorectal cancer  - REFERRAL TO GI FOR COLONOSCOPY    2. Idiopathic chronic gout of multiple sites without tophus  - URIC ACID; Future    3. Class 3 severe obesity without serious comorbidity with body mass index (BMI) of 40.0 to 44.9 in adult, unspecified obesity type (HCC)  - CBC WITH DIFFERENTIAL; Future  - Comp Metabolic Panel; Future  - Lipid Profile; Future  - TSH WITH REFLEX TO FT4; Future    4. Elevated hemoglobin A1c  - HEMOGLOBIN A1C; Future    5. Mixed hyperlipidemia  - Comp Metabolic Panel; Future  - Lipid Profile; Future    6. Vitamin D deficiency  - VITAMIN D,25 HYDROXY; Future    7. Other male erectile dysfunction  - Lipid Profile; Future    8. Routine health maintenance  - HEMOGLOBIN A1C; Future  - CBC WITH DIFFERENTIAL; Future  - Comp Metabolic Panel; Future  - Lipid Profile; Future  - TSH WITH REFLEX TO FT4; Future  - VITAMIN D,25 HYDROXY; Future  - URIC ACID; Future  - REFERRAL TO GI FOR COLONOSCOPY

## 2021-01-18 NOTE — TELEPHONE ENCOUNTER
Requested Prescriptions     Pending Prescriptions Disp Refills   • tadalafil (CIALIS) 5 MG tablet 10 Tab 0     Sig: TAKE 1 TABLET BY MOUTH ONCE DAILY AS NEEDED FOR ERECTILE DYSFUNCTION (MAX OF 1 TABLET PER DAY)   • allopurinol (ZYLOPRIM) 100 MG Tab 30 Tab 0     Sig: Take 1 Tab by mouth every day. APPOINTMENT REQUIRED FOR FUTURE REFILLS       ARAVIND Souza.

## 2021-01-25 ENCOUNTER — HOSPITAL ENCOUNTER (OUTPATIENT)
Dept: LAB | Facility: MEDICAL CENTER | Age: 61
End: 2021-01-25
Attending: NURSE PRACTITIONER
Payer: COMMERCIAL

## 2021-01-25 DIAGNOSIS — E66.01 CLASS 3 SEVERE OBESITY WITHOUT SERIOUS COMORBIDITY WITH BODY MASS INDEX (BMI) OF 40.0 TO 44.9 IN ADULT, UNSPECIFIED OBESITY TYPE (HCC): ICD-10-CM

## 2021-01-25 DIAGNOSIS — R73.09 ELEVATED HEMOGLOBIN A1C: ICD-10-CM

## 2021-01-25 DIAGNOSIS — Z00.00 ROUTINE HEALTH MAINTENANCE: ICD-10-CM

## 2021-01-25 DIAGNOSIS — E55.9 VITAMIN D DEFICIENCY: ICD-10-CM

## 2021-01-25 DIAGNOSIS — N52.8 OTHER MALE ERECTILE DYSFUNCTION: ICD-10-CM

## 2021-01-25 DIAGNOSIS — E78.2 MIXED HYPERLIPIDEMIA: ICD-10-CM

## 2021-01-25 DIAGNOSIS — M1A.09X0 IDIOPATHIC CHRONIC GOUT OF MULTIPLE SITES WITHOUT TOPHUS: ICD-10-CM

## 2021-01-25 LAB
25(OH)D3 SERPL-MCNC: 18 NG/ML (ref 30–100)
ALBUMIN SERPL BCP-MCNC: 4.1 G/DL (ref 3.2–4.9)
ALBUMIN/GLOB SERPL: 1.6 G/DL
ALP SERPL-CCNC: 57 U/L (ref 30–99)
ALT SERPL-CCNC: 15 U/L (ref 2–50)
ANION GAP SERPL CALC-SCNC: 11 MMOL/L (ref 7–16)
AST SERPL-CCNC: 18 U/L (ref 12–45)
BASOPHILS # BLD AUTO: 0.9 % (ref 0–1.8)
BASOPHILS # BLD: 0.05 K/UL (ref 0–0.12)
BILIRUB SERPL-MCNC: 0.5 MG/DL (ref 0.1–1.5)
BUN SERPL-MCNC: 14 MG/DL (ref 8–22)
CALCIUM SERPL-MCNC: 9 MG/DL (ref 8.5–10.5)
CHLORIDE SERPL-SCNC: 106 MMOL/L (ref 96–112)
CHOLEST SERPL-MCNC: 198 MG/DL (ref 100–199)
CO2 SERPL-SCNC: 25 MMOL/L (ref 20–33)
CREAT SERPL-MCNC: 0.94 MG/DL (ref 0.5–1.4)
EOSINOPHIL # BLD AUTO: 0.18 K/UL (ref 0–0.51)
EOSINOPHIL NFR BLD: 3.1 % (ref 0–6.9)
ERYTHROCYTE [DISTWIDTH] IN BLOOD BY AUTOMATED COUNT: 37.2 FL (ref 35.9–50)
EST. AVERAGE GLUCOSE BLD GHB EST-MCNC: 114 MG/DL
FASTING STATUS PATIENT QL REPORTED: NORMAL
GLOBULIN SER CALC-MCNC: 2.5 G/DL (ref 1.9–3.5)
GLUCOSE SERPL-MCNC: 85 MG/DL (ref 65–99)
HBA1C MFR BLD: 5.6 % (ref 0–5.6)
HCT VFR BLD AUTO: 45.3 % (ref 42–52)
HDLC SERPL-MCNC: 49 MG/DL
HGB BLD-MCNC: 15.1 G/DL (ref 14–18)
IMM GRANULOCYTES # BLD AUTO: 0.01 K/UL (ref 0–0.11)
IMM GRANULOCYTES NFR BLD AUTO: 0.2 % (ref 0–0.9)
LDLC SERPL CALC-MCNC: 119 MG/DL
LYMPHOCYTES # BLD AUTO: 2.84 K/UL (ref 1–4.8)
LYMPHOCYTES NFR BLD: 49.3 % (ref 22–41)
MCH RBC QN AUTO: 28.9 PG (ref 27–33)
MCHC RBC AUTO-ENTMCNC: 33.3 G/DL (ref 33.7–35.3)
MCV RBC AUTO: 86.8 FL (ref 81.4–97.8)
MONOCYTES # BLD AUTO: 0.51 K/UL (ref 0–0.85)
MONOCYTES NFR BLD AUTO: 8.9 % (ref 0–13.4)
NEUTROPHILS # BLD AUTO: 2.17 K/UL (ref 1.82–7.42)
NEUTROPHILS NFR BLD: 37.6 % (ref 44–72)
NRBC # BLD AUTO: 0 K/UL
NRBC BLD-RTO: 0 /100 WBC
PLATELET # BLD AUTO: 273 K/UL (ref 164–446)
PMV BLD AUTO: 9.5 FL (ref 9–12.9)
POTASSIUM SERPL-SCNC: 3.9 MMOL/L (ref 3.6–5.5)
PROT SERPL-MCNC: 6.6 G/DL (ref 6–8.2)
RBC # BLD AUTO: 5.22 M/UL (ref 4.7–6.1)
SODIUM SERPL-SCNC: 142 MMOL/L (ref 135–145)
TRIGL SERPL-MCNC: 150 MG/DL (ref 0–149)
TSH SERPL DL<=0.005 MIU/L-ACNC: 3.13 UIU/ML (ref 0.38–5.33)
URATE SERPL-MCNC: 5.4 MG/DL (ref 2.5–8.3)
WBC # BLD AUTO: 5.8 K/UL (ref 4.8–10.8)

## 2021-01-25 PROCEDURE — 80061 LIPID PANEL: CPT

## 2021-01-25 PROCEDURE — 83036 HEMOGLOBIN GLYCOSYLATED A1C: CPT

## 2021-01-25 PROCEDURE — 82306 VITAMIN D 25 HYDROXY: CPT

## 2021-01-25 PROCEDURE — 84550 ASSAY OF BLOOD/URIC ACID: CPT

## 2021-01-25 PROCEDURE — 85025 COMPLETE CBC W/AUTO DIFF WBC: CPT

## 2021-01-25 PROCEDURE — 84443 ASSAY THYROID STIM HORMONE: CPT

## 2021-01-25 PROCEDURE — 36415 COLL VENOUS BLD VENIPUNCTURE: CPT

## 2021-01-25 PROCEDURE — 80053 COMPREHEN METABOLIC PANEL: CPT

## 2021-02-04 ENCOUNTER — NURSE TRIAGE (OUTPATIENT)
Dept: HEALTH INFORMATION MANAGEMENT | Facility: OTHER | Age: 61
End: 2021-02-04

## 2021-02-04 NOTE — TELEPHONE ENCOUNTER
Positive COVID result last night. Slight fever the last Tuesday that lasted a couple of days. Nasal congestion for a couple of days that has gone away. Pt wanting information on what to do if these come back. Information given to pt.    Reason for Disposition  • [1] COVID-19 infection diagnosed or suspected AND [2] mild symptoms (fever, cough) AND [3] no trouble breathing or other complications    Additional Information  • Negative: SEVERE difficulty breathing (e.g., struggling for each breath, speaks in single words)  • Negative: Difficult to awaken or acting confused (e.g., disoriented, slurred speech)  • Negative: Bluish (or gray) lips or face now  • Negative: Shock suspected (e.g., cold/pale/clammy skin, too weak to stand, low BP, rapid pulse)  • Negative: Sounds like a life-threatening emergency to the triager  • Negative: [1] COVID-19 suspected (e.g., cough, fever, shortness of breath) AND [2] public health department recommends testing  • Negative: [1] COVID-19 exposure AND [2] no symptoms  • Negative: COVID-19 and Breastfeeding, questions about  • Negative: SEVERE or constant chest pain (Exception: mild central chest pain, present only when coughing)  • Negative: MODERATE difficulty breathing (e.g., speaks in phrases, SOB even at rest, pulse 100-120)  • Negative: MILD difficulty breathing (e.g., minimal/no SOB at rest, SOB with walking, pulse <100)  • Negative: Chest pain  • Negative: Patient sounds very sick or weak to the triager  • Negative: Fever > 103 F (39.4 C)  • Negative: [1] Fever > 101 F (38.3 C) AND [2] age > 60  • Negative: [1] Fever > 100.0 F (37.8 C) AND [2] bedridden (e.g., nursing home patient, CVA, chronic illness, recovering from surgery)  • Negative: HIGH RISK patient (e.g., age > 64 years, diabetes, heart or lung disease, weak immune system)  • Negative: Fever present > 3 days (72 hours)  • Negative: [1] Fever returns after gone for over 24 hours AND [2] symptoms worse or not improved  •  "Negative: [1] Continuous (nonstop) coughing interferes with work or school AND [2] no improvement using cough treatment per protocol  • Negative: Cough present > 3 weeks    Answer Assessment - Initial Assessment Questions  1. COVID-19 DIAGNOSIS: \"Who made your Coronavirus (COVID-19) diagnosis?\" \"Was it confirmed by a positive lab test?\" If not diagnosed by a HCP, ask \"Are there lots of cases (community spread) where you live?\" (See public health department website, if unsure)    * MAJOR community spread: high number of cases; numbers of cases are increasing; many people hospitalized.    * MINOR community spread: low number of cases; not increasing; few or no people hospitalized      MAJOR  2. ONSET: \"When did the COVID-19 symptoms start?\"       Last tuesday  3. WORST SYMPTOM: \"What is your worst symptom?\" (e.g., cough, fever, shortness of breath, muscle aches)      None now  4. COUGH: \"How bad is the cough?\"        no  5. FEVER: \"Do you have a fever?\" If so, ask: \"What is your temperature, how was it measured, and when did it start?\"      Not now  6. RESPIRATORY STATUS: \"Describe your breathing?\" (e.g., shortness of breath, wheezing, unable to speak)       no  7. BETTER-SAME-WORSE: \"Are you getting better, staying the same or getting worse compared to yesterday?\"  If getting worse, ask, \"In what way?\"      better  8. HIGH RISK DISEASE: \"Do you have any chronic medical problems?\" (e.g., asthma, heart or lung disease, weak immune system, etc.)      unknown  9. PREGNANCY: \"Is there any chance you are pregnant?\" \"When was your last menstrual period?\"      no  10. OTHER SYMPTOMS: \"Do you have any other symptoms?\"  (e.g., runny nose, headache, sore throat, loss of smell)        NA    Protocols used: CORONAVIRUS (COVID-19) DIAGNOSED OR [id number]      "

## 2021-02-04 NOTE — TELEPHONE ENCOUNTER
Regarding: Positive covid test, medical advise   ----- Message from Dee Davis sent at 2/4/2021  1:56 PM PST -----  Patient tested positive with covid on 2-1-21 and would like medical advise as to how to care for himself. Patient says only symptom at this time is congestion.

## 2021-02-21 NOTE — PROGRESS NOTES
Subjective:   CC:   Chief Complaint   Patient presents with   • Annual Exam     HPI:   Sheng Joel is a 61 y.o. male who presents for annual exam    Class 3 severe obesity without serious comorbidity with body mass index (BMI) of 40.0 to 44.9 in adult (HCC)  Chronic, uncontrolled.  Patient reports that he has not been very physically active in the last month since a Covid positive test.  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    Vitamin D deficiency  Chronic, uncontrolled.  Does not take over-the-counter vitamin D or calcium supplement.  Does take a multivitamin most days.    Chronic idiopathic gout of multiple sites  Chronic, stable.  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  States that he has not had a flare in over 2 years.  Will use indomethacin for a couple days during a flare and symptoms resolved immediately.    Elevated hemoglobin A1c  Resolved with most recent labs.    Mixed hyperlipidemia  New problem to examiner.  Chronic, ongoing without medication.  Reports that he is not eating very healthy at the moment, but he does walk a lot for his job in a [employer].  Is starting to work on healthier diet and more activity to lose weight.     Last Colorectal Cancer Screenin2011  Last Tdap: 1/3/2019  Received HPV series: Aged out  Hx STDs: No, is interested in testing.  Reports that he donates blood every 3 months and believes that they test for some STDs.    Exercise: minimal exercise, one hour walking weekly.  Walks a lot for his job in a [employer].  Diet: Currently poor.    He  has a past medical history of COVID-19 (2021), Elevated hemoglobin A1c (2019), Gout, Obesity, morbid, BMI 40.0-49.9 (HCC), and Pneumonia.  He  has a past surgical history that includes hernia repair () and vasectomy  (2018).    Family History   Problem Relation Age of Onset   • Breast Cancer Mother    • Cancer Mother         Breast cancer   • Heart Disease Father    • Hypertension Father    • Kidney Disease Father    • Cancer Father         skin cancer   • Obesity Sister    • Colon Cancer Brother    • Cancer Brother         Prostate Cancer   • Prostate cancer Brother    • Cancer Maternal Grandmother         Colon cancer   • Colon Cancer Maternal Grandmother    • Heart Attack Maternal Grandfather 45   • No Known Problems Paternal Grandmother    • Stroke Paternal Grandfather    • Prostate cancer Cousin    • Cancer Maternal Uncle         Prostate cancer   • Prostate cancer Maternal Uncle    • Cancer Maternal Uncle         Lung Cancer   • Lung Cancer Maternal Uncle    • Cancer Paternal Aunt         Brain tumors   • No Known Problems Son      Social History     Tobacco Use   • Smoking status: Former Smoker     Packs/day: 0.00     Years: 0.00     Pack years: 0.00     Types: Cigars   • Smokeless tobacco: Former User     Types: Chew     Quit date: 1/3/2012   • Tobacco comment: formerly smoked a few cigars per year   Substance Use Topics   • Alcohol use: Yes     Alcohol/week: 0.0 oz     Comment: twice weekly - recent DUI   • Drug use: No     He  reports being sexually active and has had partner(s) who are Female. He reports using the following methods of birth control/protection: Condom and Surgical.    Patient Active Problem List    Diagnosis Date Noted   • Mixed hyperlipidemia 01/05/2019   • Other male erectile dysfunction 01/03/2019   • Vitamin D deficiency 01/03/2019   • Class 3 severe obesity without serious comorbidity with body mass index (BMI) of 40.0 to 44.9 in adult (HCC) 11/14/2017   • Chronic idiopathic gout of multiple sites 11/07/2016     Current Outpatient Medications   Medication Sig Dispense Refill   • allopurinol (ZYLOPRIM) 100 MG Tab Take 1 tablet by mouth every day. 90 tablet 3   • INDOMETHACIN PO Take  by mouth as  "needed. Gout flare     • tadalafil (CIALIS) 5 MG tablet TAKE 1 TABLET BY MOUTH ONCE DAILY AS NEEDED FOR ERECTILE DYSFUNCTION (MAX OF 1 TABLET PER DAY) 10 Tab 0   • Multiple Vitamins-Minerals (MULTI FOR HIM) Cap Take  by mouth.       No current facility-administered medications for this visit.     No Known Allergies    Review of Systems   Constitutional: Negative for fever, chills and malaise/fatigue.   HENT: Negative for congestion.    Eyes: Negative for pain.   Respiratory: Negative for cough and shortness of breath.    Cardiovascular: Negative for chest pain and leg swelling.   Gastrointestinal: Negative for nausea, vomiting, abdominal pain and diarrhea.   Genitourinary: Negative for dysuria and hematuria.   Skin: Negative for rash.   Neurological: Negative for dizziness, focal weakness and headaches.   Endo/Heme/Allergies: Does not bruise/bleed easily.   Psychiatric/Behavioral: Negative for depression.  The patient is not nervous/anxious.      Objective:   /72 (BP Location: Left arm, Patient Position: Sitting, BP Cuff Size: Adult)   Pulse 60   Temp 37.1 °C (98.8 °F) (Temporal)   Resp 16   Ht 1.702 m (5' 7\")   Wt 120 kg (265 lb)   SpO2 94%   BMI 41.50 kg/m²      Wt Readings from Last 4 Encounters:   02/22/21 120 kg (265 lb)   01/31/19 116 kg (256 lb)   01/03/19 119 kg (262 lb)   11/14/17 99.3 kg (219 lb)     Physical Exam:  Constitutional: Well-developed and well-nourished. Not diaphoretic. No distress.   Skin: Skin is warm and dry. No rash noted.  Head: Atraumatic without lesions.  Eyes: Conjunctivae and extraocular motions are normal. Pupils are equal, round, and reactive to light. No scleral icterus.   Ears:  External ears unremarkable. Tympanic membranes clear and intact.  Nose: Nares patent. Septum midline. Turbinates without erythema nor edema. No discharge.   Mouth/Throat: Tongue normal. Oropharynx is clear and moist. Posterior pharynx without erythema or exudates.  Neck: Supple, trachea " midline. Normal range of motion. No thyromegaly present. No lymphadenopathy--cervical or supraclavicular.  Cardiovascular: Regular rate and rhythm, S1 and S2 without murmur, rubs, or gallops.    Respiratory: Effort normal. Clear to auscultation throughout. No adventitious sounds.   Abdomen: Obese, soft, non tender, and without distention. Active bowel sounds in all four quadrants. No rebound, guarding.  Rectal: deferred  Prostate: deferred  Extremities: No cyanosis, clubbing, erythema, nor edema. Distal pulses intact and symmetric.   Musculoskeletal: All major joints AROM full in all directions without pain.  Neurological: Alert and oriented x 3. Grossly non-focal. Strength and sensation grossly intact.  Psychiatric:  Behavior, mood, and affect are appropriate.    Assessment and Plan:   1. Encounter for well adult exam without abnormal findings  2. Routine health maintenance  Due for repeat annual labs in 2022.  - HEMOGLOBIN A1C; Future  - CBC WITH DIFFERENTIAL; Future  - Comp Metabolic Panel; Future  - HCV Scrn ( [id number] 1xLife); Future  - Lipid Profile; Future  - MICROALBUMIN CREAT RATIO URINE; Future  - TSH WITH REFLEX TO FT4; Future  - VITAMIN D,25 HYDROXY; Future  - URIC ACID; Future    3. Class 3 severe obesity without serious comorbidity with body mass index (BMI) of 40.0 to 44.9 in adult, unspecified obesity type (HCC)  Chronic, controlled.  Encouraged diet high in fruits, vegetables, and fiber. And a diet low in salt, refined carbohydrates, cholesterol, saturated fat, and trans fatty acids.    Encouraged a minimum of 30 minutes of moderate intensity aerobic exercise (eg, brisk walking) is recommended on five days each week. Or 30 minutes of vigorous-intensity aerobic exercise (eg, jogging) on three days each week.   Patient's body mass index is 41.5 kg/m². Exercise and nutrition counseling were performed at this visit.  Due for updated annual labs in 2022.  - HEMOGLOBIN A1C; Future  -  CBC WITH DIFFERENTIAL; Future  - Comp Metabolic Panel; Future  - Lipid Profile; Future  - TSH WITH REFLEX TO FT4; Future  - VITAMIN D,25 HYDROXY; Future    4. Mixed hyperlipidemia  Chronic, ongoing without medication.   Encouraged diet high in fruits, vegetables, and fiber. And a diet low in salt, refined carbohydrates, cholesterol, saturated fat, and trans fatty acids.    Encouraged a minimum of 30 minutes of moderate intensity aerobic exercise (eg, brisk walking) is recommended on five days each week. Or 30 minutes of vigorous-intensity aerobic exercise (eg, jogging) on three days each week.   Due for updated lab work in 2022.  - Comp Metabolic Panel; Future  - Lipid Profile; Future  - TSH WITH REFLEX TO FT4; Future    5. Idiopathic chronic gout of multiple sites without tophus  Chronic, stable.  Continue allopurinol 100 mg/day, refill provided.  Plan to check uric acid with annual labs in 2022.  - URIC ACID; Future  - allopurinol (ZYLOPRIM) 100 MG Tab; Take 1 tablet by mouth every day.  Dispense: 90 tablet; Refill: 3    6. Vitamin D deficiency  Chronic, uncontrolled.  Recommend over-the-counter vitamin D and calcium supplement daily.  Due for updated lab work in 2022.   - VITAMIN D,25 HYDROXY; Future    7. Elevated hemoglobin A1c  Resolved.  Encouraged healthy diet, exercise, weight loss.  Due for updated annual labs in 2022.  - HEMOGLOBIN A1C; Future  - Comp Metabolic Panel; Future  - Lipid Profile; Future  - MICROALBUMIN CREAT RATIO URINE; Future    8. Need for hepatitis C screening test  9. Other problems related to lifestyle  Due for screening.  - HCV Scrn ( [id number] 1xLife); Future    10. Screening for STDs (sexually transmitted diseases)  Requesting STD screening.  - Chlamydia/GC PCR Urine Or Swab; Future  - HSV 1/2 IGG W/ TYPE SPECIFIC RFLX; Future  - RPR (SYPHILIS); Future    11. Need for vaccination  Given today.  - Shingrix Vaccine  - Influenza Vaccine Quad  Injection (PF)     I have placed the below orders and discussed them with an approved delegating provider.  The MA is performing the below orders under the direction of Dr. Omalley.     Health maintenance:  Completed   Labs per orders  Immunizations per orders  Patient counseled about skin care, diet, supplements, and exercise.  Discussed diet and exercise, colorectal cancer screening, STD prevention and adequate intake of calcium and vitamin D     Follow-up: Return in about 1 year (around 2/22/2022) for Preventative Annual, Follow up Labs.     Please note that this dictation was created using voice recognition software. I have worked with consultants from the vendor as well as technical experts from [employer] to optimize the interface. I have made every reasonable attempt to correct obvious errors, but I expect that there are errors of grammar and possibly content that I did not discover before finalizing the note.

## 2021-02-22 ENCOUNTER — OFFICE VISIT (OUTPATIENT)
Dept: MEDICAL GROUP | Facility: PHYSICIAN GROUP | Age: 61
End: 2021-02-22
Payer: COMMERCIAL

## 2021-02-22 VITALS
TEMPERATURE: 98.8 F | RESPIRATION RATE: 16 BRPM | SYSTOLIC BLOOD PRESSURE: 140 MMHG | WEIGHT: 265 LBS | DIASTOLIC BLOOD PRESSURE: 72 MMHG | BODY MASS INDEX: 41.59 KG/M2 | HEART RATE: 60 BPM | OXYGEN SATURATION: 94 % | HEIGHT: 67 IN

## 2021-02-22 DIAGNOSIS — Z00.00 ENCOUNTER FOR WELL ADULT EXAM WITHOUT ABNORMAL FINDINGS: ICD-10-CM

## 2021-02-22 DIAGNOSIS — E55.9 VITAMIN D DEFICIENCY: ICD-10-CM

## 2021-02-22 DIAGNOSIS — Z00.00 ROUTINE HEALTH MAINTENANCE: ICD-10-CM

## 2021-02-22 DIAGNOSIS — M1A.09X0 IDIOPATHIC CHRONIC GOUT OF MULTIPLE SITES WITHOUT TOPHUS: ICD-10-CM

## 2021-02-22 DIAGNOSIS — Z11.3 SCREENING FOR STDS (SEXUALLY TRANSMITTED DISEASES): ICD-10-CM

## 2021-02-22 DIAGNOSIS — Z23 NEED FOR VACCINATION: ICD-10-CM

## 2021-02-22 DIAGNOSIS — Z72.89 OTHER PROBLEMS RELATED TO LIFESTYLE: ICD-10-CM

## 2021-02-22 DIAGNOSIS — R73.09 ELEVATED HEMOGLOBIN A1C: ICD-10-CM

## 2021-02-22 DIAGNOSIS — Z11.59 NEED FOR HEPATITIS C SCREENING TEST: ICD-10-CM

## 2021-02-22 DIAGNOSIS — E78.2 MIXED HYPERLIPIDEMIA: ICD-10-CM

## 2021-02-22 DIAGNOSIS — E66.01 CLASS 3 SEVERE OBESITY WITHOUT SERIOUS COMORBIDITY WITH BODY MASS INDEX (BMI) OF 40.0 TO 44.9 IN ADULT, UNSPECIFIED OBESITY TYPE (HCC): ICD-10-CM

## 2021-02-22 PROCEDURE — 99396 PREV VISIT EST AGE 40-64: CPT | Mod: 25 | Performed by: NURSE PRACTITIONER

## 2021-02-22 PROCEDURE — 90471 IMMUNIZATION ADMIN: CPT | Performed by: NURSE PRACTITIONER

## 2021-02-22 PROCEDURE — 90686 IIV4 VACC NO PRSV 0.5 ML IM: CPT | Performed by: NURSE PRACTITIONER

## 2021-02-22 PROCEDURE — 90472 IMMUNIZATION ADMIN EACH ADD: CPT | Performed by: NURSE PRACTITIONER

## 2021-02-22 PROCEDURE — 90750 HZV VACC RECOMBINANT IM: CPT | Performed by: NURSE PRACTITIONER

## 2021-02-22 RX ORDER — ALLOPURINOL 100 MG/1
100 TABLET ORAL DAILY
Qty: 90 TABLET | Refills: 3 | Status: SHIPPED | OUTPATIENT
Start: 2021-02-22 | End: 2022-02-28 | Stop reason: SDUPTHER

## 2021-02-22 SDOH — ECONOMIC STABILITY: HOUSING INSECURITY
IN THE LAST 12 MONTHS, WAS THERE A TIME WHEN YOU DID NOT HAVE A STEADY PLACE TO SLEEP OR SLEPT IN A SHELTER (INCLUDING NOW)?: NO

## 2021-02-22 SDOH — ECONOMIC STABILITY: HOUSING INSECURITY: IN THE LAST 12 MONTHS, HOW MANY PLACES HAVE YOU LIVED?: 1

## 2021-02-22 SDOH — HEALTH STABILITY: PHYSICAL HEALTH: ON AVERAGE, HOW MANY MINUTES DO YOU ENGAGE IN EXERCISE AT THIS LEVEL?: 150+ MINUTES

## 2021-02-22 SDOH — ECONOMIC STABILITY: INCOME INSECURITY: IN THE LAST 12 MONTHS, WAS THERE A TIME WHEN YOU WERE NOT ABLE TO PAY THE MORTGAGE OR RENT ON TIME?: NO

## 2021-02-22 SDOH — ECONOMIC STABILITY: TRANSPORTATION INSECURITY
IN THE PAST 12 MONTHS, HAS THE LACK OF TRANSPORTATION KEPT YOU FROM MEDICAL APPOINTMENTS OR FROM GETTING MEDICATIONS?: NO

## 2021-02-22 SDOH — ECONOMIC STABILITY: TRANSPORTATION INSECURITY
IN THE PAST 12 MONTHS, HAS LACK OF RELIABLE TRANSPORTATION KEPT YOU FROM MEDICAL APPOINTMENTS, MEETINGS, WORK OR FROM GETTING THINGS NEEDED FOR DAILY LIVING?: NO

## 2021-02-22 SDOH — HEALTH STABILITY: PHYSICAL HEALTH: ON AVERAGE, HOW MANY DAYS PER WEEK DO YOU ENGAGE IN MODERATE TO STRENUOUS EXERCISE (LIKE A BRISK WALK)?: 5 DAYS

## 2021-02-22 SDOH — HEALTH STABILITY: MENTAL HEALTH
STRESS IS WHEN SOMEONE FEELS TENSE, NERVOUS, ANXIOUS, OR CAN'T SLEEP AT NIGHT BECAUSE THEIR MIND IS TROUBLED. HOW STRESSED ARE YOU?: ONLY A LITTLE

## 2021-02-22 ASSESSMENT — SOCIAL DETERMINANTS OF HEALTH (SDOH)
WITHIN THE PAST 12 MONTHS, THE FOOD YOU BOUGHT JUST DIDN'T LAST AND YOU DIDN'T HAVE MONEY TO GET MORE: NEVER TRUE
DO YOU BELONG TO ANY CLUBS OR ORGANIZATIONS SUCH AS CHURCH GROUPS UNIONS, FRATERNAL OR ATHLETIC GROUPS, OR SCHOOL GROUPS?: NO
HOW OFTEN DO YOU ATTENT MEETINGS OF THE CLUB OR ORGANIZATION YOU BELONG TO?: NEVER
HOW HARD IS IT FOR YOU TO PAY FOR THE VERY BASICS LIKE FOOD, HOUSING, MEDICAL CARE, AND HEATING?: NOT HARD AT ALL
HOW OFTEN DO YOU HAVE SIX OR MORE DRINKS ON ONE OCCASION: LESS THAN MONTHLY
HOW OFTEN DO YOU ATTEND CHURCH OR RELIGIOUS SERVICES?: MORE THAN 4 TIMES PER YEAR
IN A TYPICAL WEEK, HOW MANY TIMES DO YOU TALK ON THE PHONE WITH FAMILY, FRIENDS, OR NEIGHBORS?: MORE THAN THREE TIMES A WEEK
WITHIN THE PAST 12 MONTHS, YOU WORRIED THAT YOUR FOOD WOULD RUN OUT BEFORE YOU GOT THE MONEY TO BUY MORE: NEVER TRUE
HOW OFTEN DO YOU GET TOGETHER WITH FRIENDS OR RELATIVES?: THREE TIMES A WEEK
HOW MANY DRINKS CONTAINING ALCOHOL DO YOU HAVE ON A TYPICAL DAY WHEN YOU ARE DRINKING: 3 OR 4
HOW OFTEN DO YOU HAVE A DRINK CONTAINING ALCOHOL: 2-4 TIMES A MONTH

## 2021-02-22 ASSESSMENT — PATIENT HEALTH QUESTIONNAIRE - PHQ9: CLINICAL INTERPRETATION OF PHQ2 SCORE: 0

## 2021-02-22 ASSESSMENT — FIBROSIS 4 INDEX: FIB4 SCORE: 1.02

## 2021-02-22 NOTE — ASSESSMENT & PLAN NOTE
Chronic, uncontrolled.  Patient reports that he has not been very physically active in the last month since a Covid positive test.  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

## 2021-02-22 NOTE — ASSESSMENT & PLAN NOTE
Chronic, uncontrolled.  Does not take over-the-counter vitamin D or calcium supplement.  Does take a multivitamin most days.

## 2021-02-22 NOTE — ASSESSMENT & PLAN NOTE
Chronic, stable.  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  States that he has not had a flare in over 2 years.  Will use indomethacin for a couple days during a flare and symptoms resolved immediately.

## 2021-02-22 NOTE — ASSESSMENT & PLAN NOTE
New problem to examiner.  Chronic, ongoing without medication.  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  Is starting to work on healthier diet and more activity to lose weight.

## 2021-03-03 ENCOUNTER — HOSPITAL ENCOUNTER (OUTPATIENT)
Dept: LAB | Facility: MEDICAL CENTER | Age: 61
End: 2021-03-03
Attending: NURSE PRACTITIONER
Payer: COMMERCIAL

## 2021-03-03 DIAGNOSIS — Z11.3 SCREENING FOR STDS (SEXUALLY TRANSMITTED DISEASES): ICD-10-CM

## 2021-03-03 LAB — TREPONEMA PALLIDUM IGG+IGM AB [PRESENCE] IN SERUM OR PLASMA BY IMMUNOASSAY: NORMAL

## 2021-03-03 PROCEDURE — 86694 HERPES SIMPLEX NES ANTBDY: CPT

## 2021-03-03 PROCEDURE — 87591 N.GONORRHOEAE DNA AMP PROB: CPT

## 2021-03-03 PROCEDURE — 86696 HERPES SIMPLEX TYPE 2 TEST: CPT

## 2021-03-03 PROCEDURE — 86780 TREPONEMA PALLIDUM: CPT

## 2021-03-03 PROCEDURE — 86695 HERPES SIMPLEX TYPE 1 TEST: CPT

## 2021-03-03 PROCEDURE — 87491 CHLMYD TRACH DNA AMP PROBE: CPT

## 2021-03-03 PROCEDURE — 36415 COLL VENOUS BLD VENIPUNCTURE: CPT

## 2021-03-04 LAB
C TRACH DNA SPEC QL NAA+PROBE: NEGATIVE
N GONORRHOEA DNA SPEC QL NAA+PROBE: NEGATIVE
SPECIMEN SOURCE: NORMAL

## 2021-03-06 LAB
HSV1 GG IGG SER-ACNC: 0.42 IV
HSV1+2 IGG SER IA-ACNC: >22.4 IV
HSV2 GG IGG SER-ACNC: 10 IV

## 2021-03-15 DIAGNOSIS — Z23 NEED FOR VACCINATION: ICD-10-CM

## 2021-04-06 DIAGNOSIS — Z12.5 SCREENING FOR MALIGNANT NEOPLASM OF PROSTATE: ICD-10-CM

## 2021-04-07 NOTE — PROGRESS NOTES
1. Screening for malignant neoplasm of prostate  Patient requesting annual screening.  - PROSTATE SPECIFIC AG SCREENING; Future

## 2021-04-19 ENCOUNTER — HOSPITAL ENCOUNTER (OUTPATIENT)
Dept: LAB | Facility: MEDICAL CENTER | Age: 61
End: 2021-04-19
Attending: NURSE PRACTITIONER
Payer: COMMERCIAL

## 2021-04-19 DIAGNOSIS — Z12.5 SCREENING FOR MALIGNANT NEOPLASM OF PROSTATE: ICD-10-CM

## 2021-04-19 LAB — PSA SERPL-MCNC: 0.84 NG/ML (ref 0–4)

## 2021-04-19 PROCEDURE — 36415 COLL VENOUS BLD VENIPUNCTURE: CPT

## 2021-04-19 PROCEDURE — 84153 ASSAY OF PSA TOTAL: CPT

## 2021-06-07 ENCOUNTER — NON-PROVIDER VISIT (OUTPATIENT)
Dept: MEDICAL GROUP | Facility: PHYSICIAN GROUP | Age: 61
End: 2021-06-07
Payer: COMMERCIAL

## 2021-06-07 DIAGNOSIS — Z23 NEED FOR VACCINATION: ICD-10-CM

## 2021-06-07 PROCEDURE — 90471 IMMUNIZATION ADMIN: CPT | Performed by: NURSE PRACTITIONER

## 2021-06-07 PROCEDURE — 90750 HZV VACC RECOMBINANT IM: CPT | Performed by: NURSE PRACTITIONER

## 2022-02-07 ENCOUNTER — HOSPITAL ENCOUNTER (OUTPATIENT)
Dept: LAB | Facility: MEDICAL CENTER | Age: 62
End: 2022-02-07
Attending: NURSE PRACTITIONER
Payer: COMMERCIAL

## 2022-02-07 DIAGNOSIS — R73.09 ELEVATED HEMOGLOBIN A1C: ICD-10-CM

## 2022-02-07 DIAGNOSIS — E66.01 CLASS 3 SEVERE OBESITY WITHOUT SERIOUS COMORBIDITY WITH BODY MASS INDEX (BMI) OF 40.0 TO 44.9 IN ADULT, UNSPECIFIED OBESITY TYPE (HCC): ICD-10-CM

## 2022-02-07 DIAGNOSIS — E55.9 VITAMIN D DEFICIENCY: ICD-10-CM

## 2022-02-07 DIAGNOSIS — Z00.00 ROUTINE HEALTH MAINTENANCE: ICD-10-CM

## 2022-02-07 DIAGNOSIS — E78.2 MIXED HYPERLIPIDEMIA: ICD-10-CM

## 2022-02-07 DIAGNOSIS — Z00.00 ENCOUNTER FOR WELL ADULT EXAM WITHOUT ABNORMAL FINDINGS: ICD-10-CM

## 2022-02-07 DIAGNOSIS — M1A.09X0 IDIOPATHIC CHRONIC GOUT OF MULTIPLE SITES WITHOUT TOPHUS: ICD-10-CM

## 2022-02-07 DIAGNOSIS — Z11.59 NEED FOR HEPATITIS C SCREENING TEST: ICD-10-CM

## 2022-02-07 DIAGNOSIS — Z72.89 OTHER PROBLEMS RELATED TO LIFESTYLE: ICD-10-CM

## 2022-02-07 LAB
25(OH)D3 SERPL-MCNC: 21 NG/ML (ref 30–100)
ALBUMIN SERPL BCP-MCNC: 4.4 G/DL (ref 3.2–4.9)
ALBUMIN/GLOB SERPL: 1.8 G/DL
ALP SERPL-CCNC: 56 U/L (ref 30–99)
ALT SERPL-CCNC: 13 U/L (ref 2–50)
ANION GAP SERPL CALC-SCNC: 12 MMOL/L (ref 7–16)
AST SERPL-CCNC: 15 U/L (ref 12–45)
BASOPHILS # BLD AUTO: 0.5 % (ref 0–1.8)
BASOPHILS # BLD: 0.04 K/UL (ref 0–0.12)
BILIRUB SERPL-MCNC: 0.6 MG/DL (ref 0.1–1.5)
BUN SERPL-MCNC: 24 MG/DL (ref 8–22)
CALCIUM SERPL-MCNC: 9 MG/DL (ref 8.5–10.5)
CHLORIDE SERPL-SCNC: 104 MMOL/L (ref 96–112)
CHOLEST SERPL-MCNC: 216 MG/DL (ref 100–199)
CO2 SERPL-SCNC: 23 MMOL/L (ref 20–33)
CREAT SERPL-MCNC: 1.05 MG/DL (ref 0.5–1.4)
CREAT UR-MCNC: 146 MG/DL
EOSINOPHIL # BLD AUTO: 0.07 K/UL (ref 0–0.51)
EOSINOPHIL NFR BLD: 0.9 % (ref 0–6.9)
ERYTHROCYTE [DISTWIDTH] IN BLOOD BY AUTOMATED COUNT: 40.3 FL (ref 35.9–50)
EST. AVERAGE GLUCOSE BLD GHB EST-MCNC: 117 MG/DL
GLOBULIN SER CALC-MCNC: 2.4 G/DL (ref 1.9–3.5)
GLUCOSE SERPL-MCNC: 90 MG/DL (ref 65–99)
HBA1C MFR BLD: 5.7 % (ref 4–5.6)
HCT VFR BLD AUTO: 42.4 % (ref 42–52)
HCV AB SER QL: NORMAL
HDLC SERPL-MCNC: 62 MG/DL
HGB BLD-MCNC: 14.7 G/DL (ref 14–18)
IMM GRANULOCYTES # BLD AUTO: 0.02 K/UL (ref 0–0.11)
IMM GRANULOCYTES NFR BLD AUTO: 0.2 % (ref 0–0.9)
LDLC SERPL CALC-MCNC: 136 MG/DL
LYMPHOCYTES # BLD AUTO: 2.84 K/UL (ref 1–4.8)
LYMPHOCYTES NFR BLD: 35 % (ref 22–41)
MCH RBC QN AUTO: 30.5 PG (ref 27–33)
MCHC RBC AUTO-ENTMCNC: 34.7 G/DL (ref 33.7–35.3)
MCV RBC AUTO: 88 FL (ref 81.4–97.8)
MICROALBUMIN UR-MCNC: 1.7 MG/DL
MICROALBUMIN/CREAT UR: 12 MG/G (ref 0–30)
MONOCYTES # BLD AUTO: 0.65 K/UL (ref 0–0.85)
MONOCYTES NFR BLD AUTO: 8 % (ref 0–13.4)
NEUTROPHILS # BLD AUTO: 4.5 K/UL (ref 1.82–7.42)
NEUTROPHILS NFR BLD: 55.4 % (ref 44–72)
NRBC # BLD AUTO: 0 K/UL
NRBC BLD-RTO: 0 /100 WBC
PLATELET # BLD AUTO: 252 K/UL (ref 164–446)
PMV BLD AUTO: 9.7 FL (ref 9–12.9)
POTASSIUM SERPL-SCNC: 3.9 MMOL/L (ref 3.6–5.5)
PROT SERPL-MCNC: 6.8 G/DL (ref 6–8.2)
RBC # BLD AUTO: 4.82 M/UL (ref 4.7–6.1)
SODIUM SERPL-SCNC: 139 MMOL/L (ref 135–145)
TRIGL SERPL-MCNC: 90 MG/DL (ref 0–149)
TSH SERPL DL<=0.005 MIU/L-ACNC: 2.14 UIU/ML (ref 0.38–5.33)
URATE SERPL-MCNC: 7.8 MG/DL (ref 2.5–8.3)
WBC # BLD AUTO: 8.1 K/UL (ref 4.8–10.8)

## 2022-02-07 PROCEDURE — 82043 UR ALBUMIN QUANTITATIVE: CPT

## 2022-02-07 PROCEDURE — 83036 HEMOGLOBIN GLYCOSYLATED A1C: CPT

## 2022-02-07 PROCEDURE — G0472 HEP C SCREEN HIGH RISK/OTHER: HCPCS

## 2022-02-07 PROCEDURE — 82306 VITAMIN D 25 HYDROXY: CPT

## 2022-02-07 PROCEDURE — 84550 ASSAY OF BLOOD/URIC ACID: CPT

## 2022-02-07 PROCEDURE — 84443 ASSAY THYROID STIM HORMONE: CPT

## 2022-02-07 PROCEDURE — 82570 ASSAY OF URINE CREATININE: CPT

## 2022-02-07 PROCEDURE — 80053 COMPREHEN METABOLIC PANEL: CPT

## 2022-02-07 PROCEDURE — 80061 LIPID PANEL: CPT

## 2022-02-07 PROCEDURE — 85025 COMPLETE CBC W/AUTO DIFF WBC: CPT

## 2022-02-07 PROCEDURE — 36415 COLL VENOUS BLD VENIPUNCTURE: CPT

## 2022-02-27 SDOH — ECONOMIC STABILITY: HOUSING INSECURITY: IN THE LAST 12 MONTHS, HOW MANY PLACES HAVE YOU LIVED?: 2

## 2022-02-27 SDOH — ECONOMIC STABILITY: TRANSPORTATION INSECURITY
IN THE PAST 12 MONTHS, HAS LACK OF TRANSPORTATION KEPT YOU FROM MEETINGS, WORK, OR FROM GETTING THINGS NEEDED FOR DAILY LIVING?: NO

## 2022-02-27 SDOH — HEALTH STABILITY: PHYSICAL HEALTH: ON AVERAGE, HOW MANY MINUTES DO YOU ENGAGE IN EXERCISE AT THIS LEVEL?: 150+ MIN

## 2022-02-27 SDOH — ECONOMIC STABILITY: FOOD INSECURITY: WITHIN THE PAST 12 MONTHS, YOU WORRIED THAT YOUR FOOD WOULD RUN OUT BEFORE YOU GOT MONEY TO BUY MORE.: NEVER TRUE

## 2022-02-27 SDOH — ECONOMIC STABILITY: FOOD INSECURITY: WITHIN THE PAST 12 MONTHS, THE FOOD YOU BOUGHT JUST DIDN'T LAST AND YOU DIDN'T HAVE MONEY TO GET MORE.: NEVER TRUE

## 2022-02-27 SDOH — ECONOMIC STABILITY: INCOME INSECURITY: IN THE LAST 12 MONTHS, WAS THERE A TIME WHEN YOU WERE NOT ABLE TO PAY THE MORTGAGE OR RENT ON TIME?: NO

## 2022-02-27 SDOH — HEALTH STABILITY: MENTAL HEALTH
STRESS IS WHEN SOMEONE FEELS TENSE, NERVOUS, ANXIOUS, OR CAN'T SLEEP AT NIGHT BECAUSE THEIR MIND IS TROUBLED. HOW STRESSED ARE YOU?: NOT AT ALL

## 2022-02-27 SDOH — HEALTH STABILITY: PHYSICAL HEALTH: ON AVERAGE, HOW MANY DAYS PER WEEK DO YOU ENGAGE IN MODERATE TO STRENUOUS EXERCISE (LIKE A BRISK WALK)?: 5 DAYS

## 2022-02-27 SDOH — ECONOMIC STABILITY: INCOME INSECURITY: HOW HARD IS IT FOR YOU TO PAY FOR THE VERY BASICS LIKE FOOD, HOUSING, MEDICAL CARE, AND HEATING?: NOT HARD AT ALL

## 2022-02-27 ASSESSMENT — SOCIAL DETERMINANTS OF HEALTH (SDOH)
HOW OFTEN DO YOU GET TOGETHER WITH FRIENDS OR RELATIVES?: THREE TIMES A WEEK
IN A TYPICAL WEEK, HOW MANY TIMES DO YOU TALK ON THE PHONE WITH FAMILY, FRIENDS, OR NEIGHBORS?: MORE THAN THREE TIMES A WEEK
HOW OFTEN DO YOU GET TOGETHER WITH FRIENDS OR RELATIVES?: THREE TIMES A WEEK
HOW OFTEN DO YOU ATTENT MEETINGS OF THE CLUB OR ORGANIZATION YOU BELONG TO?: NEVER
HOW OFTEN DO YOU ATTEND CHURCH OR RELIGIOUS SERVICES?: 1 TO 4 TIMES PER YEAR
HOW OFTEN DO YOU ATTENT MEETINGS OF THE CLUB OR ORGANIZATION YOU BELONG TO?: NEVER
HOW MANY DRINKS CONTAINING ALCOHOL DO YOU HAVE ON A TYPICAL DAY WHEN YOU ARE DRINKING: 3 OR 4
HOW OFTEN DO YOU HAVE A DRINK CONTAINING ALCOHOL: 2-4 TIMES A MONTH
IN A TYPICAL WEEK, HOW MANY TIMES DO YOU TALK ON THE PHONE WITH FAMILY, FRIENDS, OR NEIGHBORS?: MORE THAN THREE TIMES A WEEK
HOW OFTEN DO YOU ATTEND CHURCH OR RELIGIOUS SERVICES?: 1 TO 4 TIMES PER YEAR
DO YOU BELONG TO ANY CLUBS OR ORGANIZATIONS SUCH AS CHURCH GROUPS UNIONS, FRATERNAL OR ATHLETIC GROUPS, OR SCHOOL GROUPS?: NO
HOW OFTEN DO YOU HAVE SIX OR MORE DRINKS ON ONE OCCASION: NEVER
DO YOU BELONG TO ANY CLUBS OR ORGANIZATIONS SUCH AS CHURCH GROUPS UNIONS, FRATERNAL OR ATHLETIC GROUPS, OR SCHOOL GROUPS?: NO
HOW HARD IS IT FOR YOU TO PAY FOR THE VERY BASICS LIKE FOOD, HOUSING, MEDICAL CARE, AND HEATING?: NOT HARD AT ALL
WITHIN THE PAST 12 MONTHS, YOU WORRIED THAT YOUR FOOD WOULD RUN OUT BEFORE YOU GOT THE MONEY TO BUY MORE: NEVER TRUE

## 2022-02-27 ASSESSMENT — LIFESTYLE VARIABLES
HOW MANY STANDARD DRINKS CONTAINING ALCOHOL DO YOU HAVE ON A TYPICAL DAY: 3 OR 4
HOW OFTEN DO YOU HAVE SIX OR MORE DRINKS ON ONE OCCASION: NEVER
HOW OFTEN DO YOU HAVE A DRINK CONTAINING ALCOHOL: 2-4 TIMES A MONTH

## 2022-02-28 ENCOUNTER — OFFICE VISIT (OUTPATIENT)
Dept: MEDICAL GROUP | Facility: PHYSICIAN GROUP | Age: 62
End: 2022-02-28
Payer: COMMERCIAL

## 2022-02-28 VITALS
TEMPERATURE: 97.8 F | OXYGEN SATURATION: 95 % | DIASTOLIC BLOOD PRESSURE: 84 MMHG | HEART RATE: 70 BPM | HEIGHT: 67 IN | BODY MASS INDEX: 40.49 KG/M2 | SYSTOLIC BLOOD PRESSURE: 148 MMHG | WEIGHT: 258 LBS

## 2022-02-28 DIAGNOSIS — M1A.09X0 IDIOPATHIC CHRONIC GOUT OF MULTIPLE SITES WITHOUT TOPHUS: ICD-10-CM

## 2022-02-28 DIAGNOSIS — E66.01 CLASS 3 SEVERE OBESITY DUE TO EXCESS CALORIES WITHOUT SERIOUS COMORBIDITY WITH BODY MASS INDEX (BMI) OF 40.0 TO 44.9 IN ADULT (HCC): ICD-10-CM

## 2022-02-28 DIAGNOSIS — Z00.01 ENCOUNTER FOR WELL ADULT EXAM WITH ABNORMAL FINDINGS: ICD-10-CM

## 2022-02-28 DIAGNOSIS — E55.9 VITAMIN D DEFICIENCY: ICD-10-CM

## 2022-02-28 DIAGNOSIS — R73.03 PREDIABETES: ICD-10-CM

## 2022-02-28 DIAGNOSIS — N52.8 OTHER MALE ERECTILE DYSFUNCTION: ICD-10-CM

## 2022-02-28 DIAGNOSIS — Z23 NEED FOR VACCINATION: ICD-10-CM

## 2022-02-28 DIAGNOSIS — R09.89 LEFT CAROTID BRUIT: ICD-10-CM

## 2022-02-28 DIAGNOSIS — E78.2 MIXED HYPERLIPIDEMIA: ICD-10-CM

## 2022-02-28 PROCEDURE — 90471 IMMUNIZATION ADMIN: CPT | Performed by: NURSE PRACTITIONER

## 2022-02-28 PROCEDURE — 99396 PREV VISIT EST AGE 40-64: CPT | Mod: 25 | Performed by: NURSE PRACTITIONER

## 2022-02-28 PROCEDURE — 90686 IIV4 VACC NO PRSV 0.5 ML IM: CPT | Performed by: NURSE PRACTITIONER

## 2022-02-28 RX ORDER — ERGOCALCIFEROL 1.25 MG/1
50000 CAPSULE ORAL
Qty: 12 CAPSULE | Refills: 3 | Status: SHIPPED | OUTPATIENT
Start: 2022-02-28 | End: 2023-01-11

## 2022-02-28 RX ORDER — ALLOPURINOL 100 MG/1
100 TABLET ORAL DAILY
Qty: 90 TABLET | Refills: 3 | Status: SHIPPED | OUTPATIENT
Start: 2022-02-28 | End: 2023-03-06 | Stop reason: SDUPTHER

## 2022-02-28 RX ORDER — TADALAFIL 5 MG/1
TABLET ORAL
Qty: 10 TABLET | Refills: 11 | Status: SHIPPED | OUTPATIENT
Start: 2022-02-28

## 2022-02-28 ASSESSMENT — FIBROSIS 4 INDEX: FIB4 SCORE: 1.01

## 2022-02-28 ASSESSMENT — PATIENT HEALTH QUESTIONNAIRE - PHQ9: CLINICAL INTERPRETATION OF PHQ2 SCORE: 0

## 2022-02-28 NOTE — ASSESSMENT & PLAN NOTE
Chronic, stable. Continues allopurinol 100 mg/day, denies side effects of the medication. Had a flare a couple of weeks ago when he was travelling and he forgot to take his allopurinol. Took two doses of indomethacin and the flare resolved. Due for annual labs in February 2023.

## 2022-02-28 NOTE — ASSESSMENT & PLAN NOTE
Chronic, ongoing without medication. 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 This has affected his diet as well as exercise. He returned to the gym yesterday and is confident that he will be able to easily lose 20 lbs. He stays active while at work as well. Will be travelling in April to the [facility] to see his girlfriend so he is especially motivated. He is not interested in starting a statin at this time.

## 2022-02-28 NOTE — ASSESSMENT & PLAN NOTE
Chronic, ongoing. 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 This has affected his diet as well as exercise. He returned to the gym yesterday and is confident that he will be able to easily lose 20 lbs. He stays active while at work as well. Will be travelling in April to the [facility] to see his girlfriend so he is especially motivated.

## 2022-02-28 NOTE — PROGRESS NOTES
Subjective:     CC:   Chief Complaint   Patient presents with   • Annual Exam     Follow up labs     HPI:   Sheng Joel is a 62 y.o. male who presents for annual exam    Class 3 severe obesity without serious comorbidity with body mass index (BMI) of 40.0 to 44.9 in adult (HCC)  Chronic, ongoing. 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 This has affected his diet as well as exercise. He returned to the gym yesterday and is confident that he will be able to easily lose 20 lbs. He stays active while at work as well. Will be travelling in April to the [facility] to see his girlfriend so he is especially motivated.     Mixed hyperlipidemia  Chronic, ongoing without medication. 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 This has affected his diet as well as exercise. He returned to the gym yesterday and is confident that he will be able to easily lose 20 lbs. He stays active while at work as well. Will be travelling in April to the [facility] to see his girlfriend so he is especially motivated. He is not interested in starting a statin at this time.    Vitamin D deficiency  Chronic, uncontrolled. Does not take vitamin d supplement. Due for annual labs in 2023.    Chronic idiopathic gout of multiple sites  Chronic, stable. Continues allopurinol 100 mg/day, denies side effects of the medication. Had a flare a couple of weeks ago when he was travelling and he forgot to take his allopurinol. Took two doses of indomethacin and the flare resolved. Due for annual labs in 2023.     Last Colorectal Cancer Screenin2021, 10 year recall  Last Tdap: 1/3/2019  Received HPV series: Aged out  Hx STDs: No    Exercise: Returned to the gym yesterday  Diet: Currently poor, motivated to improve      He  has a past medical history of COVID-19 (2021), Elevated hemoglobin A1c (2019), Gout, Obesity, morbid,  BMI 40.0-49.9 (HCC), and Pneumonia.  He  has a past surgical history that includes hernia repair (1963) and vasectomy (2018).    Family History   Problem Relation Age of Onset   • Breast Cancer Mother    • Cancer Mother         Breast cancer   • Heart Disease Father    • Hypertension Father    • Kidney Disease Father    • Cancer Father         skin cancer   • Obesity Sister    • Colon Cancer Brother    • Cancer Brother         Prostate Cancer   • Prostate cancer Brother    • Cancer Maternal Grandmother         Colon cancer   • Colon Cancer Maternal Grandmother    • Heart Attack Maternal Grandfather 45   • No Known Problems Paternal Grandmother    • Stroke Paternal Grandfather    • Prostate cancer Cousin    • Cancer Maternal Uncle         Prostate cancer   • Prostate cancer Maternal Uncle    • Cancer Maternal Uncle         Lung Cancer   • Lung Cancer Maternal Uncle    • Cancer Paternal Aunt         Brain tumors   • No Known Problems Son    • Stroke Other      Social History     Tobacco Use   • Smoking status: Former Smoker     Packs/day: 0.00     Years: 0.00     Pack years: 0.00     Types: Cigars   • Smokeless tobacco: Former User     Types: Chew     Quit date: 1/3/2012   • Tobacco comment: formerly smoked a few cigars per year   Substance Use Topics   • Alcohol use: Yes     Alcohol/week: 0.0 oz     Comment: twice weekly - recent DUI   • Drug use: No     He  reports being sexually active and has had partner(s) who are female. He reports using the following methods of birth control/protection: Condom and Surgical.    Patient Active Problem List    Diagnosis Date Noted   • Left carotid bruit 02/28/2022   • Mixed hyperlipidemia 01/05/2019   • Other male erectile dysfunction 01/03/2019   • Vitamin D deficiency 01/03/2019   • Class 3 severe obesity without serious comorbidity with body mass index (BMI) of 40.0 to 44.9 in adult (HCC) 11/14/2017   • Chronic idiopathic gout of multiple sites 11/07/2016     Current Outpatient  "Medications   Medication Sig Dispense Refill   • vitamin D2, Ergocalciferol, (DRISDOL) 1.25 MG (12116 UT) Cap capsule Take 1 Capsule by mouth every 7 days. Take with food. 12 Capsule 3   • allopurinol (ZYLOPRIM) 100 MG Tab Take 1 Tablet by mouth every day. 90 Tablet 3   • tadalafil (CIALIS) 5 MG tablet TAKE 1 TABLET BY MOUTH ONCE DAILY AS NEEDED FOR ERECTILE DYSFUNCTION (MAX OF 1 TABLET PER DAY) 10 Tablet 11   • INDOMETHACIN PO Take  by mouth as needed. Gout flare     • Multiple Vitamins-Minerals (MULTI FOR HIM) Cap Take  by mouth.       No current facility-administered medications for this visit.     No Known Allergies    Review of Systems   Constitutional: Negative for fever, chills and malaise/fatigue.   HENT: Negative for congestion.    Eyes: Negative for pain.   Respiratory: Negative for cough and shortness of breath.    Cardiovascular: Negative for chest pain and leg swelling.   Gastrointestinal: Negative for nausea, vomiting, abdominal pain and diarrhea.   Genitourinary: Negative for dysuria and hematuria.   Skin: Negative for rash.   Neurological: Negative for dizziness, focal weakness and headaches.   Endo/Heme/Allergies: Does not bruise/bleed easily.   Psychiatric/Behavioral: Negative for depression.  The patient is not nervous/anxious.      Objective:   /84 (BP Location: Left arm, Patient Position: Sitting, BP Cuff Size: Adult)   Pulse 70   Temp 36.6 °C (97.8 °F) (Temporal)   Ht 1.702 m (5' 7\")   Wt 117 kg (258 lb)   SpO2 95%   BMI 40.41 kg/m²      Wt Readings from Last 4 Encounters:   02/28/22 117 kg (258 lb)   02/22/21 120 kg (265 lb)   01/31/19 116 kg (256 lb)   01/03/19 119 kg (262 lb)     Physical Exam:  Constitutional: Well-developed and well-nourished. Not diaphoretic. No distress.   Skin: Skin is warm and dry. No rash noted.  Head: Atraumatic without lesions.  Eyes: Conjunctivae and extraocular motions are normal. Pupils are equal, round, and reactive to light. No scleral icterus. "   Ears:  External ears unremarkable. Tympanic membranes clear and intact.  Nose: Nares patent. Septum midline. Turbinates without erythema nor edema. No discharge.   Mouth/Throat: Tongue normal. Oropharynx is clear and moist. Posterior pharynx without erythema or exudates.  Neck: Left carotid bruit. Supple, trachea midline. Normal range of motion. No thyromegaly present. No lymphadenopathy--cervical or supraclavicular.  Cardiovascular: Regular rate and rhythm, S1 and S2 without murmur, rubs, or gallops.    Respiratory: Effort normal. Clear to auscultation throughout. No adventitious sounds.   Abdomen: Soft, non tender, and without distention. Active bowel sounds in all four quadrants. No rebound, guarding.  Extremities: No cyanosis, clubbing, erythema, nor edema. Radial pulses intact and symmetric.   Musculoskeletal: All major joints AROM full in all directions without pain.  Neurological: Alert and oriented x 3. Grossly non-focal. Strength and sensation grossly intact.   Psychiatric:  Behavior, mood, and affect are appropriate.    Assessment and Plan:   1. Encounter for well adult exam with abnormal findings    2. Left carotid bruit  New to examiner and patient, noted on exam today.  Plan to have patient complete bilateral carotid Doppler ultrasound.  Will notify patient of results through [employer].  Patient declines starting statin at this time.  We will plan to repeat lipid profile in 6 months.  - US-CAROTID DOPPLER BILAT; Future    3. Class 3 severe obesity due to excess calories without serious comorbidity with body mass index (BMI) of 40.0 to 44.9 in adult (HCC)  Chronic, ongoing.  Encouraged healthy diet, regular exercise, weight loss.  Due for annual labs in February 2023.    4. Mixed hyperlipidemia  5. Prediabetes  Chronic, ongoing without medication.  Patient declines starting statin, would prefer to work on diet, exercise, weight loss.  Plan to repeat lipid profile and A1c in 6 months and follow-up in  clinic to review results.  - Lipid Profile; Future  - HEMOGLOBIN A1C; Future    6. Vitamin D deficiency  Chronic, uncontrolled.  Start vitamin D 50,000 units 1 time weekly.  Due for annual labs in February 2023.  - vitamin D2, Ergocalciferol, (DRISDOL) 1.25 MG (35784 UT) Cap capsule; Take 1 Capsule by mouth every 7 days. Take with food.  Dispense: 12 Capsule; Refill: 3    7. Idiopathic chronic gout of multiple sites without tophus  Chronic, stable.  Continue allopurinol 100 mg daily, refill sent to pharmacy.  Due for annual labs in February 2023.  - allopurinol (ZYLOPRIM) 100 MG Tab; Take 1 Tablet by mouth every day.  Dispense: 90 Tablet; Refill: 3    8. Other male erectile dysfunction  Continue tadalafil 5 mg once daily as needed, refill sent to pharmacy.  - tadalafil (CIALIS) 5 MG tablet; TAKE 1 TABLET BY MOUTH ONCE DAILY AS NEEDED FOR ERECTILE DYSFUNCTION (MAX OF 1 TABLET PER DAY)  Dispense: 10 Tablet; Refill: 11    9. Need for vaccination  Given today.  - Influenza Vaccine Quad Injection (PF)     I have placed the below orders and discussed them with an approved delegating provider.  The MA is performing the below orders under the direction of Dr. Omalley.     Health maintenance: Up to date   Labs per orders  Immunizations per orders  Patient counseled about skin care, diet, supplements, and exercise.  Discussed diet and exercise, colorectal cancer screening and adequate intake of calcium and vitamin D     Follow-up: Return in about 6 months (around 8/28/2022) for Follow up Labs.     Please note that this dictation was created using voice recognition software. I have worked with consultants from the vendor as well as technical experts from [employer] to optimize the interface. I have made every reasonable attempt to correct obvious errors, but I expect that there are errors of grammar and possibly content that I did not discover before finalizing the note.

## 2022-03-03 ENCOUNTER — HOSPITAL ENCOUNTER (OUTPATIENT)
Dept: RADIOLOGY | Facility: MEDICAL CENTER | Age: 62
End: 2022-03-03
Attending: NURSE PRACTITIONER
Payer: COMMERCIAL

## 2022-03-03 DIAGNOSIS — R09.89 LEFT CAROTID BRUIT: ICD-10-CM

## 2022-03-03 PROCEDURE — 93880 EXTRACRANIAL BILAT STUDY: CPT

## 2022-08-15 ENCOUNTER — HOSPITAL ENCOUNTER (OUTPATIENT)
Dept: LAB | Facility: MEDICAL CENTER | Age: 62
End: 2022-08-15
Attending: NURSE PRACTITIONER
Payer: COMMERCIAL

## 2022-08-15 DIAGNOSIS — R73.03 PREDIABETES: ICD-10-CM

## 2022-08-15 DIAGNOSIS — E78.2 MIXED HYPERLIPIDEMIA: ICD-10-CM

## 2022-08-15 LAB
CHOLEST SERPL-MCNC: 161 MG/DL (ref 100–199)
EST. AVERAGE GLUCOSE BLD GHB EST-MCNC: 108 MG/DL
FASTING STATUS PATIENT QL REPORTED: NORMAL
HBA1C MFR BLD: 5.4 % (ref 4–5.6)
HDLC SERPL-MCNC: 41 MG/DL
LDLC SERPL CALC-MCNC: 107 MG/DL
TRIGL SERPL-MCNC: 66 MG/DL (ref 0–149)

## 2022-08-15 PROCEDURE — 36415 COLL VENOUS BLD VENIPUNCTURE: CPT

## 2022-08-15 PROCEDURE — 83036 HEMOGLOBIN GLYCOSYLATED A1C: CPT

## 2022-08-15 PROCEDURE — 80061 LIPID PANEL: CPT

## 2022-08-29 ENCOUNTER — OFFICE VISIT (OUTPATIENT)
Dept: MEDICAL GROUP | Facility: PHYSICIAN GROUP | Age: 62
End: 2022-08-29
Payer: COMMERCIAL

## 2022-08-29 VITALS
SYSTOLIC BLOOD PRESSURE: 124 MMHG | OXYGEN SATURATION: 94 % | DIASTOLIC BLOOD PRESSURE: 82 MMHG | HEIGHT: 67 IN | TEMPERATURE: 97.2 F | HEART RATE: 71 BPM | WEIGHT: 246 LBS | BODY MASS INDEX: 38.61 KG/M2

## 2022-08-29 DIAGNOSIS — E78.2 MIXED HYPERLIPIDEMIA: ICD-10-CM

## 2022-08-29 DIAGNOSIS — R73.03 PREDIABETES: ICD-10-CM

## 2022-08-29 DIAGNOSIS — Z23 NEED FOR VACCINATION: ICD-10-CM

## 2022-08-29 DIAGNOSIS — Z00.00 ROUTINE HEALTH MAINTENANCE: ICD-10-CM

## 2022-08-29 DIAGNOSIS — M1A.09X0 IDIOPATHIC CHRONIC GOUT OF MULTIPLE SITES WITHOUT TOPHUS: ICD-10-CM

## 2022-08-29 DIAGNOSIS — E66.01 CLASS 2 SEVERE OBESITY DUE TO EXCESS CALORIES WITH SERIOUS COMORBIDITY AND BODY MASS INDEX (BMI) OF 38.0 TO 38.9 IN ADULT (HCC): ICD-10-CM

## 2022-08-29 DIAGNOSIS — E55.9 VITAMIN D DEFICIENCY: ICD-10-CM

## 2022-08-29 PROBLEM — E66.812 CLASS 2 SEVERE OBESITY WITH SERIOUS COMORBIDITY AND BODY MASS INDEX (BMI) OF 38.0 TO 38.9 IN ADULT (HCC): Status: ACTIVE | Noted: 2017-11-14

## 2022-08-29 PROCEDURE — 99214 OFFICE O/P EST MOD 30 MIN: CPT | Performed by: NURSE PRACTITIONER

## 2022-08-29 ASSESSMENT — FIBROSIS 4 INDEX: FIB4 SCORE: 1.01

## 2022-08-29 NOTE — ASSESSMENT & PLAN NOTE
Chronic, improving without medication.  Reports that he is decreasing the amount of red meat he is eating by about 95%.  He is eating salads instead of the 1 pound steak sandwich.  He is also stopped drinking alcohol since 2 months ago.

## 2022-08-29 NOTE — ASSESSMENT & PLAN NOTE
Chronic, ongoing.  Reports that he stopped drinking alcohol 2 months ago and has decreased red meat intake by 95%.  States that he does have a cheat day on Sundays.

## 2022-08-30 PROCEDURE — 90746 HEPB VACCINE 3 DOSE ADULT IM: CPT | Performed by: NURSE PRACTITIONER

## 2022-08-30 PROCEDURE — 90471 IMMUNIZATION ADMIN: CPT | Performed by: NURSE PRACTITIONER

## 2022-08-31 NOTE — PROGRESS NOTES
CC:   Chief Complaint   Patient presents with    Lab Results     HISTORY OF THE PRESENT ILLNESS: Patient is a 62 y.o. male. This pleasant patient is here today to review lab results.    Health Maintenance: Completed    Mixed hyperlipidemia  Chronic, improving without medication.  Reports that he is decreasing the amount of red meat he is eating by about 95%.  He is eating salads instead of the 1 pound steak sandwich.  He is also stopped drinking alcohol since 2 months ago.    Class 2 severe obesity with serious comorbidity and body mass index (BMI) of 38.0 to 38.9 in adult (Formerly [facility])  Chronic, ongoing.  Reports that he stopped drinking alcohol 2 months ago and has decreased red meat intake by 95%.  States that he does have a cheat day on Sundays.    Allergies: Patient has no known allergies.  Current Outpatient Medications Ordered in Epic   Medication Sig Dispense Refill    vitamin D2, Ergocalciferol, (DRISDOL) 1.25 MG (89921 UT) Cap capsule Take 1 Capsule by mouth every 7 days. Take with food. 12 Capsule 3    allopurinol (ZYLOPRIM) 100 MG Tab Take 1 Tablet by mouth every day. 90 Tablet 3    tadalafil (CIALIS) 5 MG tablet TAKE 1 TABLET BY MOUTH ONCE DAILY AS NEEDED FOR ERECTILE DYSFUNCTION (MAX OF 1 TABLET PER DAY) 10 Tablet 11    INDOMETHACIN PO Take  by mouth as needed. Gout flare      Multiple Vitamins-Minerals (MULTI FOR HIM) Cap Take  by mouth.       No current Epic-ordered facility-administered medications on file.     Past Medical History:   Diagnosis Date    COVID-19 02/01/2021    Elevated hemoglobin A1c 1/5/2019    Gout     Obesity, morbid, BMI 40.0-49.9 (Formerly [facility])     Pneumonia     was hospitalized for this     Past Surgical History:   Procedure Laterality Date    VASECTOMY  2018    HERNIA REPAIR  1963     Social History     Tobacco Use    Smoking status: Former     Packs/day: 0.00     Years: 0.00     Pack years: 0.00     Types: Cigars, Cigarettes    Smokeless tobacco: Former     Types: Chew     Quit date: 1/3/2012  "   Tobacco comments:     formerly smoked a few cigars per year   Vaping Use    Vaping Use: Never used   Substance Use Topics    Alcohol use: Not Currently     Comment: twice weekly - recent DUI    Drug use: No     Social History     Social History Narrative    Not on file     Family History   Problem Relation Age of Onset    Breast Cancer Mother     Cancer Mother         Breast cancer    Heart Disease Father     Hypertension Father     Kidney Disease Father     Cancer Father         skin cancer    Obesity Sister     Colon Cancer Brother     Cancer Brother         Prostate Cancer    Prostate cancer Brother     Cancer Maternal Grandmother         Colon cancer    Colon Cancer Maternal Grandmother     Heart Attack Maternal Grandfather 45    No Known Problems Paternal Grandmother     Stroke Paternal Grandfather     Prostate cancer Cousin     Cancer Maternal Uncle         Prostate cancer    Prostate cancer Maternal Uncle     Cancer Maternal Uncle         Lung Cancer    Lung Cancer Maternal Uncle     Cancer Paternal Aunt         Brain tumors    No Known Problems Son     Stroke Other      ROS:   Constitutional: No fevers, chills, malaise/fatigue.  Eyes: No eye pain.  ENT: No sore throat, congestion.   Resp: No cough, shortness of breath.  CV: No chest pain, leg swelling, palpitations.  GI: No nausea/vomiting, abdominal pain, constipation, diarrhea.  : No dysuria, hematuria.  MSK: No weakness.  Skin: No rashes.  Neuro: No dizziness, weakness, headaches.  Psych: No suicidal ideations.    All remaining systems reviewed and found to be negative, except as stated above.         Exam: /82 (BP Location: Left arm, Patient Position: Sitting, BP Cuff Size: Small adult)   Pulse 71   Temp 36.2 °C (97.2 °F) (Temporal)   Ht 1.702 m (5' 7\")   Wt 112 kg (246 lb)   SpO2 94%  Body mass index is 38.53 kg/m².    General: Well nourished, well developed male in NAD, awake and conversant.  Eyes: Normal conjunctiva, anicteric.  Round " symmetrical pupils.  ENT: Hearing grossly intact.  No nasal discharge.  Neck: Neck is supple.  No masses or thyromegaly.  CV: No lower extremity edema.  Respiratory: Respirations are nonlabored.  No wheezing.  Abdomen: Non-Distended.  Skin: Warm.  No rashes or ulcers.  MSK: Normal ambulation.  No clubbing or cyanosis.  Neuro: Sensation and CN II-XII grossly normal.  Psych: Alert and oriented.  Cooperative, appropriate mood and affect, normal judgment.      Assessment/Plan:  1. Mixed hyperlipidemia  Chronic, improving without medication.  Continue working on healthy diet, exercise, weight loss.  Due for annual labs in March 2023 prior to annual follow-up.  - Comp Metabolic Panel; Future  - Lipid Profile; Future  - TSH WITH REFLEX TO FT4; Future    2. Prediabetes  Due for annual labs in March 2023 prior to annual follow-up.  - HEMOGLOBIN A1C; Future  - Comp Metabolic Panel; Future  - Lipid Profile; Future  - MICROALBUMIN CREAT RATIO URINE; Future    3. Class 2 severe obesity due to excess calories with serious comorbidity and body mass index (BMI) of 38.0 to 38.9 in adult (HCC)  Due for annual labs in March 2023 prior to annual follow-up.  - HEMOGLOBIN A1C; Future  - CBC WITH DIFFERENTIAL; Future  - Comp Metabolic Panel; Future  - Lipid Profile; Future  - TSH WITH REFLEX TO FT4; Future    4. Idiopathic chronic gout of multiple sites without tophus  Continue allopurinol 100 mg daily, does not need refill at this time. Due for annual labs in March 2023 prior to annual follow-up.  - URIC ACID; Future    5. Vitamin D deficiency  Continue vitamin D 50,000 units every week. Due for annual labs in March 2023 prior to annual follow-up.  - VITAMIN D,25 HYDROXY (DEFICIENCY); Future    6. Routine health maintenance  Due for annual labs in March 2023 prior to annual follow-up.  - HEMOGLOBIN A1C; Future  - CBC WITH DIFFERENTIAL; Future  - Comp Metabolic Panel; Future  - Lipid Profile; Future  - MICROALBUMIN CREAT RATIO URINE;  Future  - TSH WITH REFLEX TO FT4; Future  - VITAMIN D,25 HYDROXY (DEFICIENCY); Future  - URIC ACID; Future    7. Need for vaccination  Given today, scheduled for MA visit on 10/3/2022 for dose 2.  - Hepatitis B Vaccine Adult 20+; Future     Educated in proper administration of medication(s) ordered today including safety, possible SE, risks, benefits, rationale and alternatives to therapy.   Supportive care, differential diagnoses, and indications for immediate follow-up discussed with patient.    Pathogenesis of diagnosis discussed including typical length and natural progression.    Instructed to return to clinic or nearest emergency department for any change in condition, further concerns, or worsening of symptoms.  Patient states understanding of the plan of care and discharge instructions.    Return in about 6 months (around 2/28/2023) for Preventative Annual, Follow up Labs, As needed.    I have placed the below orders and discussed them with an approved delegating provider. The MA is performing the below orders under the direction of Dr. Hooker.    Please note that this dictation was created using voice recognition software. I have made every reasonable attempt to correct obvious errors, but I expect that there are errors of grammar and possibly content that I did not discover before finalizing the note.

## 2022-10-24 ENCOUNTER — NON-PROVIDER VISIT (OUTPATIENT)
Dept: MEDICAL GROUP | Facility: PHYSICIAN GROUP | Age: 62
End: 2022-10-24
Payer: COMMERCIAL

## 2022-10-24 DIAGNOSIS — Z23 NEED FOR VACCINATION: ICD-10-CM

## 2022-10-24 PROCEDURE — 90471 IMMUNIZATION ADMIN: CPT | Performed by: NURSE PRACTITIONER

## 2022-10-24 PROCEDURE — 99999 PR NO CHARGE: CPT | Performed by: NURSE PRACTITIONER

## 2022-10-24 PROCEDURE — 90746 HEPB VACCINE 3 DOSE ADULT IM: CPT | Performed by: NURSE PRACTITIONER

## 2022-10-24 NOTE — PROGRESS NOTES
"Sheng Joel is a 62 y.o. male here for a non-provider visit for:   HEPATITIS B 2 of 3    Reason for immunization: continue or complete series started at the office  Immunization records indicate need for vaccine: Yes, confirmed with Epic  Minimum interval has been met for this vaccine: Yes  ABN completed: Not Indicated    VIS Dated  10/15/2021 was given to patient: Yes  All IAC Questionnaire questions were answered \"No.\"    Patient tolerated injection and no adverse effects were observed or reported: Yes    Pt scheduled for next dose in series: Yes   "

## 2023-01-09 ENCOUNTER — APPOINTMENT (OUTPATIENT)
Dept: MEDICAL GROUP | Facility: PHYSICIAN GROUP | Age: 63
End: 2023-01-09
Payer: COMMERCIAL

## 2023-01-09 DIAGNOSIS — Z23 NEED FOR VACCINATION: ICD-10-CM

## 2023-01-11 DIAGNOSIS — E55.9 VITAMIN D DEFICIENCY: ICD-10-CM

## 2023-01-11 RX ORDER — ERGOCALCIFEROL 1.25 MG/1
CAPSULE ORAL
Qty: 4 CAPSULE | Refills: 0 | Status: SHIPPED | OUTPATIENT
Start: 2023-01-11 | End: 2023-02-13 | Stop reason: SDUPTHER

## 2023-01-12 NOTE — TELEPHONE ENCOUNTER
Requested Prescriptions     Pending Prescriptions Disp Refills   • vitamin D2, Ergocalciferol, (DRISDOL) 1.25 MG (25970 UT) Cap capsule [Pharmacy Med Name: Vitamin D (Ergocalciferol) 1.25 MG (36051 UT) Oral Capsule] 4 Capsule 0     Sig: TAKE 1 CAPSULE BY MOUTH ONCE A WEEK WITH FOOD       LEONELA Souza

## 2023-02-13 DIAGNOSIS — E55.9 VITAMIN D DEFICIENCY: ICD-10-CM

## 2023-02-13 RX ORDER — ERGOCALCIFEROL 1.25 MG/1
50000 CAPSULE ORAL
Qty: 4 CAPSULE | Refills: 0 | Status: SHIPPED | OUTPATIENT
Start: 2023-02-13 | End: 2023-03-06 | Stop reason: SDUPTHER

## 2023-02-13 NOTE — TELEPHONE ENCOUNTER
Requested Prescriptions     Pending Prescriptions Disp Refills   • vitamin D2, Ergocalciferol, (DRISDOL) 1.25 MG (94004 UT) Cap capsule 4 Capsule 0     Sig: Take 1 Capsule by mouth every 7 days.       ARAVIND Souza.

## 2023-02-24 ENCOUNTER — HOSPITAL ENCOUNTER (OUTPATIENT)
Dept: LAB | Facility: MEDICAL CENTER | Age: 63
End: 2023-02-24
Attending: NURSE PRACTITIONER
Payer: COMMERCIAL

## 2023-02-24 DIAGNOSIS — E78.2 MIXED HYPERLIPIDEMIA: ICD-10-CM

## 2023-02-24 DIAGNOSIS — R73.03 PREDIABETES: ICD-10-CM

## 2023-02-24 DIAGNOSIS — M1A.09X0 IDIOPATHIC CHRONIC GOUT OF MULTIPLE SITES WITHOUT TOPHUS: ICD-10-CM

## 2023-02-24 DIAGNOSIS — E55.9 VITAMIN D DEFICIENCY: ICD-10-CM

## 2023-02-24 DIAGNOSIS — Z00.00 ROUTINE HEALTH MAINTENANCE: ICD-10-CM

## 2023-02-24 DIAGNOSIS — E66.01 CLASS 2 SEVERE OBESITY DUE TO EXCESS CALORIES WITH SERIOUS COMORBIDITY AND BODY MASS INDEX (BMI) OF 38.0 TO 38.9 IN ADULT (HCC): ICD-10-CM

## 2023-02-24 LAB
25(OH)D3 SERPL-MCNC: 31 NG/ML (ref 30–100)
ALBUMIN SERPL BCP-MCNC: 4.4 G/DL (ref 3.2–4.9)
ALBUMIN/GLOB SERPL: 1.7 G/DL
ALP SERPL-CCNC: 52 U/L (ref 30–99)
ALT SERPL-CCNC: 19 U/L (ref 2–50)
ANION GAP SERPL CALC-SCNC: 12 MMOL/L (ref 7–16)
AST SERPL-CCNC: 23 U/L (ref 12–45)
BASOPHILS # BLD AUTO: 1.2 % (ref 0–1.8)
BASOPHILS # BLD: 0.07 K/UL (ref 0–0.12)
BILIRUB SERPL-MCNC: 0.6 MG/DL (ref 0.1–1.5)
BUN SERPL-MCNC: 19 MG/DL (ref 8–22)
CALCIUM ALBUM COR SERPL-MCNC: 8.8 MG/DL (ref 8.5–10.5)
CALCIUM SERPL-MCNC: 9.1 MG/DL (ref 8.5–10.5)
CHLORIDE SERPL-SCNC: 106 MMOL/L (ref 96–112)
CHOLEST SERPL-MCNC: 187 MG/DL (ref 100–199)
CO2 SERPL-SCNC: 23 MMOL/L (ref 20–33)
CREAT SERPL-MCNC: 1.06 MG/DL (ref 0.5–1.4)
CREAT UR-MCNC: 165.42 MG/DL
EOSINOPHIL # BLD AUTO: 0.13 K/UL (ref 0–0.51)
EOSINOPHIL NFR BLD: 2.2 % (ref 0–6.9)
ERYTHROCYTE [DISTWIDTH] IN BLOOD BY AUTOMATED COUNT: 42.6 FL (ref 35.9–50)
EST. AVERAGE GLUCOSE BLD GHB EST-MCNC: 103 MG/DL
FASTING STATUS PATIENT QL REPORTED: NORMAL
GFR SERPLBLD CREATININE-BSD FMLA CKD-EPI: 79 ML/MIN/1.73 M 2
GLOBULIN SER CALC-MCNC: 2.6 G/DL (ref 1.9–3.5)
GLUCOSE SERPL-MCNC: 85 MG/DL (ref 65–99)
HBA1C MFR BLD: 5.2 % (ref 4–5.6)
HCT VFR BLD AUTO: 41.3 % (ref 42–52)
HDLC SERPL-MCNC: 46 MG/DL
HGB BLD-MCNC: 13.8 G/DL (ref 14–18)
IMM GRANULOCYTES # BLD AUTO: 0.02 K/UL (ref 0–0.11)
IMM GRANULOCYTES NFR BLD AUTO: 0.3 % (ref 0–0.9)
LDLC SERPL CALC-MCNC: 123 MG/DL
LYMPHOCYTES # BLD AUTO: 2.63 K/UL (ref 1–4.8)
LYMPHOCYTES NFR BLD: 43.8 % (ref 22–41)
MCH RBC QN AUTO: 29.2 PG (ref 27–33)
MCHC RBC AUTO-ENTMCNC: 33.4 G/DL (ref 33.7–35.3)
MCV RBC AUTO: 87.5 FL (ref 81.4–97.8)
MICROALBUMIN UR-MCNC: <1.2 MG/DL
MICROALBUMIN/CREAT UR: NORMAL MG/G (ref 0–30)
MONOCYTES # BLD AUTO: 0.51 K/UL (ref 0–0.85)
MONOCYTES NFR BLD AUTO: 8.5 % (ref 0–13.4)
NEUTROPHILS # BLD AUTO: 2.64 K/UL (ref 1.82–7.42)
NEUTROPHILS NFR BLD: 44 % (ref 44–72)
NRBC # BLD AUTO: 0 K/UL
NRBC BLD-RTO: 0 /100 WBC
PLATELET # BLD AUTO: 253 K/UL (ref 164–446)
PMV BLD AUTO: 9.6 FL (ref 9–12.9)
POTASSIUM SERPL-SCNC: 4.2 MMOL/L (ref 3.6–5.5)
PROT SERPL-MCNC: 7 G/DL (ref 6–8.2)
RBC # BLD AUTO: 4.72 M/UL (ref 4.7–6.1)
SODIUM SERPL-SCNC: 141 MMOL/L (ref 135–145)
TRIGL SERPL-MCNC: 90 MG/DL (ref 0–149)
TSH SERPL DL<=0.005 MIU/L-ACNC: 4.64 UIU/ML (ref 0.38–5.33)
URATE SERPL-MCNC: 8.1 MG/DL (ref 2.5–8.3)
WBC # BLD AUTO: 6 K/UL (ref 4.8–10.8)

## 2023-02-24 PROCEDURE — 83036 HEMOGLOBIN GLYCOSYLATED A1C: CPT

## 2023-02-24 PROCEDURE — 84550 ASSAY OF BLOOD/URIC ACID: CPT

## 2023-02-24 PROCEDURE — 84443 ASSAY THYROID STIM HORMONE: CPT

## 2023-02-24 PROCEDURE — 80061 LIPID PANEL: CPT

## 2023-02-24 PROCEDURE — 82306 VITAMIN D 25 HYDROXY: CPT

## 2023-02-24 PROCEDURE — 36415 COLL VENOUS BLD VENIPUNCTURE: CPT

## 2023-02-24 PROCEDURE — 80053 COMPREHEN METABOLIC PANEL: CPT

## 2023-02-24 PROCEDURE — 85025 COMPLETE CBC W/AUTO DIFF WBC: CPT

## 2023-02-24 PROCEDURE — 82570 ASSAY OF URINE CREATININE: CPT

## 2023-02-24 PROCEDURE — 82043 UR ALBUMIN QUANTITATIVE: CPT

## 2023-03-05 SDOH — ECONOMIC STABILITY: HOUSING INSECURITY: IN THE LAST 12 MONTHS, HOW MANY PLACES HAVE YOU LIVED?: 1

## 2023-03-05 SDOH — ECONOMIC STABILITY: FOOD INSECURITY: WITHIN THE PAST 12 MONTHS, THE FOOD YOU BOUGHT JUST DIDN'T LAST AND YOU DIDN'T HAVE MONEY TO GET MORE.: NEVER TRUE

## 2023-03-05 SDOH — ECONOMIC STABILITY: INCOME INSECURITY: IN THE LAST 12 MONTHS, WAS THERE A TIME WHEN YOU WERE NOT ABLE TO PAY THE MORTGAGE OR RENT ON TIME?: NO

## 2023-03-05 SDOH — ECONOMIC STABILITY: INCOME INSECURITY: HOW HARD IS IT FOR YOU TO PAY FOR THE VERY BASICS LIKE FOOD, HOUSING, MEDICAL CARE, AND HEATING?: NOT HARD AT ALL

## 2023-03-05 SDOH — HEALTH STABILITY: PHYSICAL HEALTH: ON AVERAGE, HOW MANY MINUTES DO YOU ENGAGE IN EXERCISE AT THIS LEVEL?: 150+ MIN

## 2023-03-05 SDOH — HEALTH STABILITY: PHYSICAL HEALTH: ON AVERAGE, HOW MANY DAYS PER WEEK DO YOU ENGAGE IN MODERATE TO STRENUOUS EXERCISE (LIKE A BRISK WALK)?: 6 DAYS

## 2023-03-05 SDOH — ECONOMIC STABILITY: FOOD INSECURITY: WITHIN THE PAST 12 MONTHS, YOU WORRIED THAT YOUR FOOD WOULD RUN OUT BEFORE YOU GOT MONEY TO BUY MORE.: NEVER TRUE

## 2023-03-05 SDOH — ECONOMIC STABILITY: HOUSING INSECURITY

## 2023-03-05 ASSESSMENT — LIFESTYLE VARIABLES
HOW OFTEN DO YOU HAVE A DRINK CONTAINING ALCOHOL: NEVER
AUDIT-C TOTAL SCORE: 0
SKIP TO QUESTIONS 9-10: 1
HOW OFTEN DO YOU HAVE SIX OR MORE DRINKS ON ONE OCCASION: NEVER
HOW MANY STANDARD DRINKS CONTAINING ALCOHOL DO YOU HAVE ON A TYPICAL DAY: PATIENT DOES NOT DRINK

## 2023-03-05 ASSESSMENT — SOCIAL DETERMINANTS OF HEALTH (SDOH)
HOW HARD IS IT FOR YOU TO PAY FOR THE VERY BASICS LIKE FOOD, HOUSING, MEDICAL CARE, AND HEATING?: NOT HARD AT ALL
DO YOU BELONG TO ANY CLUBS OR ORGANIZATIONS SUCH AS CHURCH GROUPS UNIONS, FRATERNAL OR ATHLETIC GROUPS, OR SCHOOL GROUPS?: YES
HOW OFTEN DO YOU HAVE A DRINK CONTAINING ALCOHOL: NEVER
WITHIN THE PAST 12 MONTHS, YOU WORRIED THAT YOUR FOOD WOULD RUN OUT BEFORE YOU GOT THE MONEY TO BUY MORE: NEVER TRUE
HOW OFTEN DO YOU ATTEND CHURCH OR RELIGIOUS SERVICES?: MORE THAN 4 TIMES PER YEAR
HOW OFTEN DO YOU HAVE SIX OR MORE DRINKS ON ONE OCCASION: NEVER
DO YOU BELONG TO ANY CLUBS OR ORGANIZATIONS SUCH AS CHURCH GROUPS UNIONS, FRATERNAL OR ATHLETIC GROUPS, OR SCHOOL GROUPS?: YES
HOW OFTEN DO YOU ATTENT MEETINGS OF THE CLUB OR ORGANIZATION YOU BELONG TO?: MORE THAN 4 TIMES PER YEAR
HOW OFTEN DO YOU ATTEND CHURCH OR RELIGIOUS SERVICES?: MORE THAN 4 TIMES PER YEAR
HOW OFTEN DO YOU GET TOGETHER WITH FRIENDS OR RELATIVES?: ONCE A WEEK
IN A TYPICAL WEEK, HOW MANY TIMES DO YOU TALK ON THE PHONE WITH FAMILY, FRIENDS, OR NEIGHBORS?: MORE THAN THREE TIMES A WEEK
HOW OFTEN DO YOU GET TOGETHER WITH FRIENDS OR RELATIVES?: ONCE A WEEK
IN A TYPICAL WEEK, HOW MANY TIMES DO YOU TALK ON THE PHONE WITH FAMILY, FRIENDS, OR NEIGHBORS?: MORE THAN THREE TIMES A WEEK
HOW MANY DRINKS CONTAINING ALCOHOL DO YOU HAVE ON A TYPICAL DAY WHEN YOU ARE DRINKING: PATIENT DOES NOT DRINK
HOW OFTEN DO YOU ATTENT MEETINGS OF THE CLUB OR ORGANIZATION YOU BELONG TO?: MORE THAN 4 TIMES PER YEAR

## 2023-03-06 ENCOUNTER — OFFICE VISIT (OUTPATIENT)
Dept: MEDICAL GROUP | Facility: PHYSICIAN GROUP | Age: 63
End: 2023-03-06
Payer: COMMERCIAL

## 2023-03-06 VITALS
WEIGHT: 267 LBS | OXYGEN SATURATION: 96 % | HEART RATE: 68 BPM | HEIGHT: 67 IN | SYSTOLIC BLOOD PRESSURE: 140 MMHG | RESPIRATION RATE: 16 BRPM | TEMPERATURE: 97.1 F | BODY MASS INDEX: 41.91 KG/M2 | DIASTOLIC BLOOD PRESSURE: 80 MMHG

## 2023-03-06 DIAGNOSIS — E78.2 MIXED HYPERLIPIDEMIA: ICD-10-CM

## 2023-03-06 DIAGNOSIS — E66.01 MORBID OBESITY (HCC): ICD-10-CM

## 2023-03-06 DIAGNOSIS — Z12.5 SCREENING FOR MALIGNANT NEOPLASM OF PROSTATE: ICD-10-CM

## 2023-03-06 DIAGNOSIS — Z00.00 ENCOUNTER FOR WELL ADULT EXAM WITHOUT ABNORMAL FINDINGS: ICD-10-CM

## 2023-03-06 DIAGNOSIS — E55.9 VITAMIN D DEFICIENCY: ICD-10-CM

## 2023-03-06 DIAGNOSIS — M1A.09X0 IDIOPATHIC CHRONIC GOUT OF MULTIPLE SITES WITHOUT TOPHUS: ICD-10-CM

## 2023-03-06 DIAGNOSIS — Z00.00 ROUTINE HEALTH MAINTENANCE: ICD-10-CM

## 2023-03-06 PROCEDURE — 99396 PREV VISIT EST AGE 40-64: CPT | Performed by: NURSE PRACTITIONER

## 2023-03-06 RX ORDER — ERGOCALCIFEROL 1.25 MG/1
50000 CAPSULE ORAL
Qty: 12 CAPSULE | Refills: 3 | Status: SHIPPED | OUTPATIENT
Start: 2023-03-06

## 2023-03-06 RX ORDER — ALLOPURINOL 100 MG/1
100 TABLET ORAL DAILY
Qty: 90 TABLET | Refills: 3 | Status: SHIPPED | OUTPATIENT
Start: 2023-03-06 | End: 2024-01-25

## 2023-03-06 ASSESSMENT — FIBROSIS 4 INDEX: FIB4 SCORE: 1.29

## 2023-03-06 ASSESSMENT — PATIENT HEALTH QUESTIONNAIRE - PHQ9: CLINICAL INTERPRETATION OF PHQ2 SCORE: 0

## 2023-03-06 NOTE — ASSESSMENT & PLAN NOTE
147/74  129/80    Didn't sleep well this weekend, worked nights, eating a lot of popcorn with salt    Eating bad the last few months

## 2023-03-06 NOTE — PROGRESS NOTES
Subjective:     CC:   Chief Complaint   Patient presents with    Annual Exam     Lab review     HPI:   Sheng Joel is a 63 y.o. male who presents for annual exam    Anticipatory Guidance:  Cholesterol screenin2023  Diabetes screenin2023  Diet: Recommend more lean meats, fruits, vegetables, whole grains. Has been having cheat weekends.  Exercise: Encourage regular exercise. Walks a lot at the [employer] 40-60 hours per week.  Substance abuse: No, quit drinking alcohol about 1 year ago.  Safe in relationship: NA  Seatbelts, bike/motorcycle helmet: Yes  Sun protection: No  Dentist: Up to date  Eye doctor: Up to date    Cancer Screening:  Colorectal cancer screenin2021, 10 year recall    Infectious Disease Screening/Immunizations:  STI screening: Declines  HIV screen: Declines  Practices safe sex: NA    Immunizations:   Influenza: 22, out of stock in clinic   Tetanus: 1/3/2019   Pneumonia: 16   Received HPV series: Aged out     He  has a past medical history of COVID-19 (2021), Elevated hemoglobin A1c (2019), Gout, Obesity, morbid, BMI 40.0-49.9 (HCC), and Pneumonia.  He  has a past surgical history that includes hernia repair (1963) and vasectomy (2018).    Family History   Problem Relation Age of Onset    Breast Cancer Mother     Cancer Mother         Breast cancer    Heart Disease Father     Hypertension Father     Kidney Disease Father     Cancer Father         skin cancer    Obesity Sister     Colon Cancer Brother     Cancer Brother         Prostate Cancer    Prostate cancer Brother     Cancer Maternal Grandmother         Colon cancer    Colon Cancer Maternal Grandmother     Heart Attack Maternal Grandfather 45    No Known Problems Paternal Grandmother     Stroke Paternal Grandfather     Prostate cancer Cousin     Cancer Maternal Uncle         Prostate cancer    Prostate cancer Maternal Uncle     Cancer Maternal Uncle         Lung Cancer    Lung Cancer Maternal  Uncle     Cancer Paternal Aunt         Brain tumors    No Known Problems Son     Stroke Other      Social History     Tobacco Use    Smoking status: Former     Packs/day: 0.00     Years: 0.00     Pack years: 0.00     Types: Cigars, Cigarettes    Smokeless tobacco: Former     Types: Chew     Quit date: 1/3/2012    Tobacco comments:     formerly smoked a few cigars per year   Vaping Use    Vaping Use: Never used   Substance Use Topics    Alcohol use: Not Currently     Alcohol/week: 3.6 oz     Types: 6 Cans of beer per week     Comment: twice weekly - recent DUI    Drug use: Never     He  reports being sexually active and has had partner(s) who are female. He reports using the following methods of birth control/protection: Condom, Surgical, and Condom Male.    Patient Active Problem List    Diagnosis Date Noted    Left carotid bruit 02/28/2022    Mixed hyperlipidemia 01/05/2019    Other male erectile dysfunction 01/03/2019    Vitamin D deficiency 01/03/2019    Morbid obesity (HCC) 11/14/2017    Chronic idiopathic gout of multiple sites 11/07/2016     Current Outpatient Medications   Medication Sig Dispense Refill    allopurinol (ZYLOPRIM) 100 MG Tab Take 1 Tablet by mouth every day. 90 Tablet 3    vitamin D2, Ergocalciferol, (DRISDOL) 1.25 MG (95557 UT) Cap capsule Take 1 Capsule by mouth every 7 days. 12 Capsule 3    tadalafil (CIALIS) 5 MG tablet TAKE 1 TABLET BY MOUTH ONCE DAILY AS NEEDED FOR ERECTILE DYSFUNCTION (MAX OF 1 TABLET PER DAY) 10 Tablet 11    INDOMETHACIN PO Take  by mouth as needed. Gout flare      Multiple Vitamins-Minerals (MULTI FOR HIM) Cap Take  by mouth.       No current facility-administered medications for this visit.     No Known Allergies    Review of Systems   Constitutional: Negative for fever, chills and malaise/fatigue.   HENT: Negative for congestion.    Eyes: Negative for pain.   Respiratory: Negative for cough and shortness of breath.    Cardiovascular: Negative for chest pain and leg  "swelling.   Gastrointestinal: Negative for nausea, vomiting, abdominal pain and diarrhea.   Genitourinary: Negative for dysuria and hematuria.   Skin: Negative for rash.   Neurological: Negative for dizziness, focal weakness and headaches.   Endo/Heme/Allergies: Does not bruise/bleed easily.   Psychiatric/Behavioral: Negative for depression.  The patient is not nervous/anxious.      Objective:   BP (!) 140/80 (BP Location: Left arm, Patient Position: Sitting, BP Cuff Size: Large adult)   Pulse 68   Temp 36.2 °C (97.1 °F) (Temporal)   Resp 16   Ht 1.702 m (5' 7\")   Wt 121 kg (267 lb)   SpO2 96%   BMI 41.82 kg/m²      Wt Readings from Last 4 Encounters:   03/06/23 121 kg (267 lb)   08/29/22 112 kg (246 lb)   02/28/22 117 kg (258 lb)   02/22/21 120 kg (265 lb)     Physical Exam:  Constitutional: Well-developed and well-nourished. Not diaphoretic. No distress.   Skin: Skin is warm and dry. No rash noted.  Head: Atraumatic without lesions.  Eyes: Conjunctivae and extraocular motions are normal. Pupils are equal, round, and reactive to light. No scleral icterus.   Ears:  External ears unremarkable. Tympanic membranes clear and intact.  Nose: Nares patent. Septum midline. Turbinates without erythema nor edema. No discharge.   Mouth/Throat: Tongue normal. Oropharynx is clear and moist. Posterior pharynx without erythema or exudates.  Neck: Supple, trachea midline. Normal range of motion. No thyromegaly present. No lymphadenopathy--cervical or supraclavicular.  Cardiovascular: Regular rate and rhythm, S1 and S2 without murmur, rubs, or gallops.    Respiratory: Effort normal. Clear to auscultation throughout. No adventitious sounds.   Abdomen: Soft, non tender, and without distention. Active bowel sounds in all four quadrants. No rebound, guarding, masses or HSM.  Extremities: No cyanosis, clubbing, erythema, nor edema. Radial pulses intact and symmetric.   Musculoskeletal: All major joints AROM full in all directions " without pain.  Neurological: Alert and oriented x 3. Grossly non-focal. Strength and sensation grossly intact.  Psychiatric:  Behavior, mood, and affect are appropriate.    Assessment and Plan:   1. Encounter for well adult exam without abnormal findings    2. Morbid obesity (HCC)  Chronic, ongoing.  Encourage diet high in fruits, vegetables, and fiber. And a diet low in salt, refined carbohydrates, cholesterol, saturated fat, and trans fatty acids.    Encourage a minimum of 30 minutes of moderate intensity aerobic exercise (eg, brisk walking) is recommended on five days each week. Or 30 minutes of vigorous-intensity aerobic exercise (eg, jogging) on three days each week.   Patient's body mass index is 41.82 kg/m². Exercise and nutrition counseling were performed at this visit.  Due for updated annual labs in February 2024 prior to annual follow up.  - HEMOGLOBIN A1C; Future  - CBC WITH DIFFERENTIAL; Future  - Comp Metabolic Panel; Future  - Lipid Profile; Future  - TSH WITH REFLEX TO FT4; Future  - Patient identified as having weight management issue.  Appropriate orders and counseling given.    3. Mixed hyperlipidemia  Chronic, ongoing without medication. Encourage healthy diet, regular exercise outside of work, weight loss. Due for updated annual labs in February 2024 prior to annual follow up.  - Comp Metabolic Panel; Future  - Lipid Profile; Future  - TSH WITH REFLEX TO FT4; Future    4. Idiopathic chronic gout of multiple sites without tophus  Chronic, ongoing. Continue allopurinol 100 mg/day, refill sent to pharmacy. Due for updated annual labs in February 2024 prior to annual follow up.  - URIC ACID; Future  - allopurinol (ZYLOPRIM) 100 MG Tab; Take 1 Tablet by mouth every day.  Dispense: 90 Tablet; Refill: 3    5. Vitamin D deficiency  Chronic, ongoing. Continue vitamin D2 50,000 units weekly, refill sent to pharmacy. Due for updated annual labs in February 2024 prior to annual follow up.  - VITAMIN D,25  HYDROXY (DEFICIENCY); Future  - vitamin D2, Ergocalciferol, (DRISDOL) 1.25 MG (58406 UT) Cap capsule; Take 1 Capsule by mouth every 7 days.  Dispense: 12 Capsule; Refill: 3    6. Routine health maintenance  Due for updated annual labs in February 2024 prior to annual follow up.  - HEMOGLOBIN A1C; Future  - CBC WITH DIFFERENTIAL; Future  - Comp Metabolic Panel; Future  - Lipid Profile; Future  - TSH WITH REFLEX TO FT4; Future  - VITAMIN D,25 HYDROXY (DEFICIENCY); Future  - URIC ACID; Future  - PROSTATE SPECIFIC AG SCREENING; Future    7. Screening for malignant neoplasm of prostate  Due for updated annual labs in February 2024 prior to annual follow up.  - PROSTATE SPECIFIC AG SCREENING; Future     Health maintenance: Up to date   Labs per orders  Immunizations per orders  Patient counseled about skin care, diet, supplements, and exercise.  Discussed diet and exercise, colorectal cancer screening, adequate intake of calcium and vitamin D, and prostate cancer screening     Follow-up: Return in about 1 year (around 3/6/2024) for Preventative Annual, Follow up Labs, As needed ---MA hep b after 3/25/23.     Please note that this dictation was created using voice recognition software. I have worked with consultants from the vendor as well as technical experts from [employer] to optimize the interface. I have made every reasonable attempt to correct obvious errors, but I expect that there are errors of grammar and possibly content that I did not discover before finalizing the note.

## 2023-03-27 ENCOUNTER — NON-PROVIDER VISIT (OUTPATIENT)
Dept: MEDICAL GROUP | Facility: PHYSICIAN GROUP | Age: 63
End: 2023-03-27
Payer: COMMERCIAL

## 2023-03-27 DIAGNOSIS — Z23 NEED FOR VACCINATION: ICD-10-CM

## 2023-03-27 PROCEDURE — 90471 IMMUNIZATION ADMIN: CPT | Performed by: NURSE PRACTITIONER

## 2023-03-27 PROCEDURE — 90686 IIV4 VACC NO PRSV 0.5 ML IM: CPT | Performed by: NURSE PRACTITIONER

## 2023-03-27 PROCEDURE — 90472 IMMUNIZATION ADMIN EACH ADD: CPT | Performed by: NURSE PRACTITIONER

## 2023-03-27 PROCEDURE — 90746 HEPB VACCINE 3 DOSE ADULT IM: CPT | Performed by: NURSE PRACTITIONER

## 2023-03-27 NOTE — PROGRESS NOTES
"Sheng Joel is a 63 y.o. male here for a non-provider visit for:   FLU  HEPATITIS B 3 of 3    Reason for immunization: Overdue/Provider Recommended  Immunization records indicate need for vaccine: Yes, confirmed with Epic  Minimum interval has been met for this vaccine: Yes  ABN completed: Not Indicated    VIS Dated  8/6/21- 10/15/21 was given to patient: Yes  All IAC Questionnaire questions were answered \"No.\"    Patient tolerated injection and no adverse effects were observed or reported: Yes    Pt scheduled for next dose in series: No    "

## 2024-01-24 DIAGNOSIS — M1A.09X0 IDIOPATHIC CHRONIC GOUT OF MULTIPLE SITES WITHOUT TOPHUS: ICD-10-CM

## 2024-01-25 RX ORDER — ALLOPURINOL 100 MG/1
100 TABLET ORAL DAILY
Qty: 90 TABLET | Refills: 0 | Status: SHIPPED | OUTPATIENT
Start: 2024-01-25

## 2024-01-25 NOTE — TELEPHONE ENCOUNTER
Requested Prescriptions     Pending Prescriptions Disp Refills    allopurinol (ZYLOPRIM) 100 MG Tab [Pharmacy Med Name: Allopurinol 100 MG Oral Tablet] 90 Tablet 0     Sig: Take 1 tablet by mouth once daily       ARAVIND Souza.

## 2024-02-16 ENCOUNTER — HOSPITAL ENCOUNTER (OUTPATIENT)
Dept: LAB | Facility: MEDICAL CENTER | Age: 64
End: 2024-02-16
Attending: NURSE PRACTITIONER
Payer: COMMERCIAL

## 2024-02-16 DIAGNOSIS — E55.9 VITAMIN D DEFICIENCY: ICD-10-CM

## 2024-02-16 DIAGNOSIS — Z00.00 ROUTINE HEALTH MAINTENANCE: ICD-10-CM

## 2024-02-16 DIAGNOSIS — E78.2 MIXED HYPERLIPIDEMIA: ICD-10-CM

## 2024-02-16 DIAGNOSIS — M1A.09X0 IDIOPATHIC CHRONIC GOUT OF MULTIPLE SITES WITHOUT TOPHUS: ICD-10-CM

## 2024-02-16 DIAGNOSIS — Z12.5 SCREENING FOR MALIGNANT NEOPLASM OF PROSTATE: ICD-10-CM

## 2024-02-16 DIAGNOSIS — E66.01 MORBID OBESITY (HCC): ICD-10-CM

## 2024-02-16 LAB
BASOPHILS # BLD AUTO: 1.2 % (ref 0–1.8)
BASOPHILS # BLD: 0.07 K/UL (ref 0–0.12)
EOSINOPHIL # BLD AUTO: 0.14 K/UL (ref 0–0.51)
EOSINOPHIL NFR BLD: 2.3 % (ref 0–6.9)
ERYTHROCYTE [DISTWIDTH] IN BLOOD BY AUTOMATED COUNT: 38 FL (ref 35.9–50)
EST. AVERAGE GLUCOSE BLD GHB EST-MCNC: 114 MG/DL
HBA1C MFR BLD: 5.6 % (ref 4–5.6)
HCT VFR BLD AUTO: 43.1 % (ref 42–52)
HGB BLD-MCNC: 14.9 G/DL (ref 14–18)
IMM GRANULOCYTES # BLD AUTO: 0.01 K/UL (ref 0–0.11)
IMM GRANULOCYTES NFR BLD AUTO: 0.2 % (ref 0–0.9)
LYMPHOCYTES # BLD AUTO: 2.67 K/UL (ref 1–4.8)
LYMPHOCYTES NFR BLD: 44 % (ref 22–41)
MCH RBC QN AUTO: 29 PG (ref 27–33)
MCHC RBC AUTO-ENTMCNC: 34.6 G/DL (ref 32.3–36.5)
MCV RBC AUTO: 84 FL (ref 81.4–97.8)
MONOCYTES # BLD AUTO: 0.53 K/UL (ref 0–0.85)
MONOCYTES NFR BLD AUTO: 8.7 % (ref 0–13.4)
NEUTROPHILS # BLD AUTO: 2.65 K/UL (ref 1.82–7.42)
NEUTROPHILS NFR BLD: 43.6 % (ref 44–72)
NRBC # BLD AUTO: 0 K/UL
NRBC BLD-RTO: 0 /100 WBC (ref 0–0.2)
PLATELET # BLD AUTO: 283 K/UL (ref 164–446)
PMV BLD AUTO: 9.4 FL (ref 9–12.9)
RBC # BLD AUTO: 5.13 M/UL (ref 4.7–6.1)
WBC # BLD AUTO: 6.1 K/UL (ref 4.8–10.8)

## 2024-02-16 PROCEDURE — 85025 COMPLETE CBC W/AUTO DIFF WBC: CPT

## 2024-02-16 PROCEDURE — 80061 LIPID PANEL: CPT

## 2024-02-16 PROCEDURE — 80053 COMPREHEN METABOLIC PANEL: CPT

## 2024-02-16 PROCEDURE — 84153 ASSAY OF PSA TOTAL: CPT

## 2024-02-16 PROCEDURE — 36415 COLL VENOUS BLD VENIPUNCTURE: CPT

## 2024-02-16 PROCEDURE — 82306 VITAMIN D 25 HYDROXY: CPT

## 2024-02-16 PROCEDURE — 84550 ASSAY OF BLOOD/URIC ACID: CPT

## 2024-02-16 PROCEDURE — 84443 ASSAY THYROID STIM HORMONE: CPT

## 2024-02-16 PROCEDURE — 83036 HEMOGLOBIN GLYCOSYLATED A1C: CPT

## 2024-02-17 LAB
25(OH)D3 SERPL-MCNC: 35 NG/ML (ref 30–100)
ALBUMIN SERPL BCP-MCNC: 4.6 G/DL (ref 3.2–4.9)
ALBUMIN/GLOB SERPL: 1.9 G/DL
ALP SERPL-CCNC: 58 U/L (ref 30–99)
ALT SERPL-CCNC: 33 U/L (ref 2–50)
ANION GAP SERPL CALC-SCNC: 12 MMOL/L (ref 7–16)
AST SERPL-CCNC: 29 U/L (ref 12–45)
BILIRUB SERPL-MCNC: 0.7 MG/DL (ref 0.1–1.5)
BUN SERPL-MCNC: 12 MG/DL (ref 8–22)
CALCIUM ALBUM COR SERPL-MCNC: 8.6 MG/DL (ref 8.5–10.5)
CALCIUM SERPL-MCNC: 9.1 MG/DL (ref 8.5–10.5)
CHLORIDE SERPL-SCNC: 104 MMOL/L (ref 96–112)
CHOLEST SERPL-MCNC: 168 MG/DL (ref 100–199)
CO2 SERPL-SCNC: 24 MMOL/L (ref 20–33)
CREAT SERPL-MCNC: 0.92 MG/DL (ref 0.5–1.4)
GFR SERPLBLD CREATININE-BSD FMLA CKD-EPI: 93 ML/MIN/1.73 M 2
GLOBULIN SER CALC-MCNC: 2.4 G/DL (ref 1.9–3.5)
GLUCOSE SERPL-MCNC: 84 MG/DL (ref 65–99)
HDLC SERPL-MCNC: 36 MG/DL
LDLC SERPL CALC-MCNC: 106 MG/DL
POTASSIUM SERPL-SCNC: 4.1 MMOL/L (ref 3.6–5.5)
PROT SERPL-MCNC: 7 G/DL (ref 6–8.2)
PSA SERPL-MCNC: 2.13 NG/ML (ref 0–4)
SODIUM SERPL-SCNC: 140 MMOL/L (ref 135–145)
TRIGL SERPL-MCNC: 129 MG/DL (ref 0–149)
TSH SERPL DL<=0.005 MIU/L-ACNC: 2.04 UIU/ML (ref 0.38–5.33)
URATE SERPL-MCNC: 7.7 MG/DL (ref 2.5–8.3)

## 2024-03-27 ENCOUNTER — TELEPHONE (OUTPATIENT)
Dept: MEDICAL GROUP | Facility: PHYSICIAN GROUP | Age: 64
End: 2024-03-27
Payer: COMMERCIAL

## 2024-03-27 NOTE — TELEPHONE ENCOUNTER
VOICEMAIL  1. Caller Name: Daryaliliana Moy, Serge Ass't    2. Message: Called patient to schedule an appointment to be able to refill Vitamin D medication     3. Patient approves office to leave a detailed voicemail/MyChart message: no

## 2024-09-09 SDOH — ECONOMIC STABILITY: FOOD INSECURITY: WITHIN THE PAST 12 MONTHS, THE FOOD YOU BOUGHT JUST DIDN'T LAST AND YOU DIDN'T HAVE MONEY TO GET MORE.: NEVER TRUE

## 2024-09-09 SDOH — ECONOMIC STABILITY: FOOD INSECURITY: WITHIN THE PAST 12 MONTHS, YOU WORRIED THAT YOUR FOOD WOULD RUN OUT BEFORE YOU GOT MONEY TO BUY MORE.: NEVER TRUE

## 2024-09-09 SDOH — ECONOMIC STABILITY: INCOME INSECURITY: IN THE LAST 12 MONTHS, WAS THERE A TIME WHEN YOU WERE NOT ABLE TO PAY THE MORTGAGE OR RENT ON TIME?: NO

## 2024-09-09 SDOH — HEALTH STABILITY: PHYSICAL HEALTH: ON AVERAGE, HOW MANY MINUTES DO YOU ENGAGE IN EXERCISE AT THIS LEVEL?: 150+ MIN

## 2024-09-09 SDOH — HEALTH STABILITY: PHYSICAL HEALTH

## 2024-09-09 SDOH — ECONOMIC STABILITY: INCOME INSECURITY: HOW HARD IS IT FOR YOU TO PAY FOR THE VERY BASICS LIKE FOOD, HOUSING, MEDICAL CARE, AND HEATING?: NOT VERY HARD

## 2024-09-09 ASSESSMENT — LIFESTYLE VARIABLES
HOW OFTEN DO YOU HAVE SIX OR MORE DRINKS ON ONE OCCASION: NEVER
SKIP TO QUESTIONS 9-10: 1
HOW MANY STANDARD DRINKS CONTAINING ALCOHOL DO YOU HAVE ON A TYPICAL DAY: PATIENT DOES NOT DRINK
AUDIT-C TOTAL SCORE: 0
HOW OFTEN DO YOU HAVE A DRINK CONTAINING ALCOHOL: NEVER

## 2024-09-09 ASSESSMENT — SOCIAL DETERMINANTS OF HEALTH (SDOH)
IN A TYPICAL WEEK, HOW MANY TIMES DO YOU TALK ON THE PHONE WITH FAMILY, FRIENDS, OR NEIGHBORS?: MORE THAN THREE TIMES A WEEK
DO YOU BELONG TO ANY CLUBS OR ORGANIZATIONS SUCH AS CHURCH GROUPS UNIONS, FRATERNAL OR ATHLETIC GROUPS, OR SCHOOL GROUPS?: NO
HOW HARD IS IT FOR YOU TO PAY FOR THE VERY BASICS LIKE FOOD, HOUSING, MEDICAL CARE, AND HEATING?: NOT VERY HARD
WITHIN THE PAST 12 MONTHS, YOU WORRIED THAT YOUR FOOD WOULD RUN OUT BEFORE YOU GOT THE MONEY TO BUY MORE: NEVER TRUE
HOW OFTEN DO YOU ATTENT MEETINGS OF THE CLUB OR ORGANIZATION YOU BELONG TO?: NEVER
HOW OFTEN DO YOU HAVE A DRINK CONTAINING ALCOHOL: NEVER
HOW OFTEN DO YOU ATTEND CHURCH OR RELIGIOUS SERVICES?: 1 TO 4 TIMES PER YEAR
HOW OFTEN DO YOU ATTEND CHURCH OR RELIGIOUS SERVICES?: 1 TO 4 TIMES PER YEAR
IN THE PAST 12 MONTHS, HAS THE ELECTRIC, GAS, OIL, OR WATER COMPANY THREATENED TO SHUT OFF SERVICE IN YOUR HOME?: NO
IN A TYPICAL WEEK, HOW MANY TIMES DO YOU TALK ON THE PHONE WITH FAMILY, FRIENDS, OR NEIGHBORS?: MORE THAN THREE TIMES A WEEK
HOW OFTEN DO YOU ATTENT MEETINGS OF THE CLUB OR ORGANIZATION YOU BELONG TO?: NEVER
HOW OFTEN DO YOU GET TOGETHER WITH FRIENDS OR RELATIVES?: ONCE A WEEK
HOW MANY DRINKS CONTAINING ALCOHOL DO YOU HAVE ON A TYPICAL DAY WHEN YOU ARE DRINKING: PATIENT DOES NOT DRINK
HOW OFTEN DO YOU HAVE SIX OR MORE DRINKS ON ONE OCCASION: NEVER
HOW OFTEN DO YOU GET TOGETHER WITH FRIENDS OR RELATIVES?: ONCE A WEEK
DO YOU BELONG TO ANY CLUBS OR ORGANIZATIONS SUCH AS CHURCH GROUPS UNIONS, FRATERNAL OR ATHLETIC GROUPS, OR SCHOOL GROUPS?: NO

## 2024-09-12 ENCOUNTER — OFFICE VISIT (OUTPATIENT)
Dept: MEDICAL GROUP | Facility: PHYSICIAN GROUP | Age: 64
End: 2024-09-12
Payer: COMMERCIAL

## 2024-09-12 VITALS
OXYGEN SATURATION: 96 % | BODY MASS INDEX: 44.63 KG/M2 | TEMPERATURE: 97.7 F | SYSTOLIC BLOOD PRESSURE: 142 MMHG | HEIGHT: 64 IN | WEIGHT: 261.4 LBS | DIASTOLIC BLOOD PRESSURE: 82 MMHG | HEART RATE: 68 BPM

## 2024-09-12 DIAGNOSIS — M1A.09X0 IDIOPATHIC CHRONIC GOUT OF MULTIPLE SITES WITHOUT TOPHUS: ICD-10-CM

## 2024-09-12 DIAGNOSIS — Z00.00 ENCOUNTER FOR WELL ADULT EXAM WITHOUT ABNORMAL FINDINGS: ICD-10-CM

## 2024-09-12 DIAGNOSIS — Z00.00 ROUTINE HEALTH MAINTENANCE: ICD-10-CM

## 2024-09-12 DIAGNOSIS — E55.9 VITAMIN D DEFICIENCY: ICD-10-CM

## 2024-09-12 DIAGNOSIS — E78.2 MIXED HYPERLIPIDEMIA: ICD-10-CM

## 2024-09-12 DIAGNOSIS — E66.01 MORBID OBESITY (HCC): ICD-10-CM

## 2024-09-12 DIAGNOSIS — N52.8 OTHER MALE ERECTILE DYSFUNCTION: ICD-10-CM

## 2024-09-12 DIAGNOSIS — Z12.5 SCREENING FOR MALIGNANT NEOPLASM OF PROSTATE: ICD-10-CM

## 2024-09-12 DIAGNOSIS — Z11.4 SCREENING FOR HIV WITHOUT PRESENCE OF RISK FACTORS: ICD-10-CM

## 2024-09-12 PROCEDURE — 3079F DIAST BP 80-89 MM HG: CPT | Performed by: NURSE PRACTITIONER

## 2024-09-12 PROCEDURE — 99396 PREV VISIT EST AGE 40-64: CPT | Performed by: NURSE PRACTITIONER

## 2024-09-12 PROCEDURE — 3077F SYST BP >= 140 MM HG: CPT | Performed by: NURSE PRACTITIONER

## 2024-09-12 RX ORDER — ALLOPURINOL 100 MG/1
100 TABLET ORAL DAILY
Qty: 90 TABLET | Refills: 3 | Status: SHIPPED | OUTPATIENT
Start: 2024-09-12

## 2024-09-12 RX ORDER — TADALAFIL 5 MG/1
TABLET ORAL
Qty: 10 TABLET | Refills: 11 | Status: SHIPPED | OUTPATIENT
Start: 2024-09-12

## 2024-09-12 RX ORDER — ERGOCALCIFEROL 1.25 MG/1
50000 CAPSULE, LIQUID FILLED ORAL
Qty: 12 CAPSULE | Refills: 3 | Status: SHIPPED | OUTPATIENT
Start: 2024-09-12

## 2024-09-12 ASSESSMENT — PATIENT HEALTH QUESTIONNAIRE - PHQ9: CLINICAL INTERPRETATION OF PHQ2 SCORE: 0

## 2024-09-12 ASSESSMENT — FIBROSIS 4 INDEX: FIB4 SCORE: 1.12

## 2024-09-12 NOTE — PROGRESS NOTES
Subjective:   CC:   Chief Complaint   Patient presents with    Annual Exam    Medication Refill     Verbal consent was acquired by the patient to use [employer] ambient listening note generation during this visit Yes    HPI:   Sheng Joel is a 64 y.o. male who presents for annual exam:    History of Present Illness  The patient is a 64-year-old male who presents for an annual physical.    He had his blood work done 8 months ago, but due to his busy schedule, he was unable to return for a follow-up. His results were satisfactory, with the exception of elevated blood pressure. He is currently taking allopurinol and vitamin D supplements. His cholesterol level is 168, which is lower than his usual range of 180 to 230.    He has been consuming ice cream, fried chicken, and pizza for the past 2 years. He quit drinking alcohol 2 years ago and has not donated blood since. He plans to reduce his weight to 200 pounds before his next visit. He is concerned about the impact of his extra weight on his knees. He has been monitoring his caloric intake.    He has recently visited both his dentist and eye doctor.    SOCIAL HISTORY  He does not smoke cigars. He never used drugs.    FAMILY HISTORY  His brother has prostate cancer and bladder cancer. His maternal grandmother had colon cancer. His mother had breast cancer. His father had skin cancer. His maternal uncle had prostate cancer and lung cancer. His paternal aunt had brain tumors.     Anticipatory Guidance:  Cholesterol screenin2024   LDL controlled: 106 The 10-year ASCVD risk score (Joe HARRISON, et al., 2019) is: 13.9%, declines statin at this time  Diabetes screenin2024   A1c controlled: 5.6%  Diet: Recommend more lean meats, fruits, vegetables, whole grains.   Exercise: Encourage regular exercise.   Substance abuse: No   Safe in relationship: N/A  Seatbelts, bike/motorcycle helmet: Yes  Sun protection: Recommended  Dentist: Up to date   Eye doctor:  Up to date     Cancer Screening:  Colorectal cancer screenin2021, 10-year recall colonoscopy    Infectious Disease Screening/Immunizations:  STI screening: Declines  Chlamydia/Gonorrhea screening: Declines   Hep C screening: Completed  HIV screen: Ordered today  Practices safe sex: N/A    Immunizations:   Influenza: Unavailable   Tetanus: 2019   Hep B: Completed   Pneumonia: N/A, due at age 66   Shingrix: Completed    RSV: Recommended   Received HPV series: Aged out    Preventative Care Screening:   Osteoporosis Screening: NA  Tobacco Screening: Former smoker  AAA Screening: N/A    He  reports being sexually active and has had partner(s) who are female. He reports using the following methods of birth control/protection: Surgical and Condom Male.  He  has a past medical history of COVID-19 (2021), Elevated hemoglobin A1c (2019), Gout, Hyperlipidemia, Hypertension, Obesity, morbid, BMI 40.0-49.9 (HCC), and Pneumonia.  He  has a past surgical history that includes hernia repair () and vasectomy ().    Family History   Problem Relation Age of Onset    Breast Cancer Mother     Cancer Mother         Breast cancer    Heart Disease Father     Hypertension Father     Kidney Disease Father     Cancer Father         skin cancer    Skin cancer Father     Obesity Sister     Cancer Brother         Prostate Cancer and Bladder Cancer    Prostate cancer Brother     Bladder cancer Brother     Cancer Maternal Uncle         Prostate cancer    Prostate cancer Maternal Uncle     Cancer Maternal Uncle         Lung Cancer    Lung Cancer Maternal Uncle     Cancer Paternal Aunt         Brain tumors    Cancer Maternal Grandmother         Colon cancer    Colon Cancer Maternal Grandmother     Heart Attack Maternal Grandfather 45    No Known Problems Paternal Grandmother     Stroke Paternal Grandfather     No Known Problems Son     Prostate cancer Cousin     Stroke Other      Social History     Tobacco Use     Smoking status: Former     Types: Cigars    Smokeless tobacco: Former     Types: Chew     Quit date: 1/3/2012    Tobacco comments:     formerly smoked a few cigars per year   Vaping Use    Vaping status: Never Used   Substance Use Topics    Alcohol use: Not Currently     Alcohol/week: 3.6 oz     Comment: no Alcohol since July 2022    Drug use: Never     Patient Active Problem List    Diagnosis Date Noted    Left carotid bruit 02/28/2022    Mixed hyperlipidemia 01/05/2019    Other male erectile dysfunction 01/03/2019    Vitamin D deficiency 01/03/2019    Morbid obesity (HCC) 11/14/2017    Chronic idiopathic gout of multiple sites 11/07/2016     Current Outpatient Medications   Medication Sig Dispense Refill    allopurinol (ZYLOPRIM) 100 MG Tab Take 1 Tablet by mouth every day. 90 Tablet 3    vitamin D2, Ergocalciferol, (DRISDOL) 1.25 MG (47303 UT) Cap capsule Take 1 Capsule by mouth every 7 days. 12 Capsule 3    tadalafil (CIALIS) 5 MG tablet TAKE 1 TABLET BY MOUTH ONCE DAILY AS NEEDED FOR ERECTILE DYSFUNCTION (MAX OF 1 TABLET PER DAY) 10 Tablet 11    INDOMETHACIN PO Take  by mouth as needed. Gout flare      Multiple Vitamins-Minerals (MULTI FOR HIM) Cap Take  by mouth.       No current facility-administered medications for this visit.     No Known Allergies    Review of Systems   Constitutional: Negative for fever, chills and malaise/fatigue.   HENT: Negative for congestion.    Eyes: Negative for pain.   Respiratory: Negative for cough and shortness of breath.    Cardiovascular: Negative for chest pain and leg swelling.   Gastrointestinal: Negative for nausea, vomiting, abdominal pain and diarrhea.   Genitourinary: Negative for dysuria and hematuria.   Skin: Negative for rash.   Neurological: Negative for dizziness, focal weakness and headaches.   Endo/Heme/Allergies: Does not bruise/bleed easily.   Psychiatric/Behavioral: Negative for depression.  The patient is not nervous/anxious.      Objective:   BP (!) 142/82  "(BP Location: Left arm, Patient Position: Sitting, BP Cuff Size: Adult)   Pulse 68   Temp 36.5 °C (97.7 °F) (Temporal)   Ht 1.626 m (5' 4\")   Wt 119 kg (261 lb 6.4 oz)   SpO2 96%   BMI 44.87 kg/m²     Wt Readings from Last 4 Encounters:   09/12/24 119 kg (261 lb 6.4 oz)   03/06/23 121 kg (267 lb)   08/29/22 112 kg (246 lb)   02/28/22 117 kg (258 lb)     Physical Exam:  Constitutional: Well-developed and well-nourished. Not diaphoretic. No distress.   Skin: Skin is warm and dry. No rash noted.  Head: Atraumatic without lesions.  Eyes: Conjunctivae and extraocular motions are normal. Pupils are equal, round, and reactive to light. No scleral icterus.   Ears:  External ears unremarkable. Tympanic membranes clear and intact.  Nose: Nares patent. Septum midline. Turbinates without erythema nor edema. No discharge.   Mouth/Throat: Tongue normal. Oropharynx is clear and moist. Posterior pharynx without erythema or exudates.  Neck: Supple, trachea midline. Normal range of motion. No thyromegaly present. No lymphadenopathy--cervical or supraclavicular.  Cardiovascular: Regular rate and rhythm, S1 and S2 without murmur, rubs, or gallops.    Respiratory: Effort normal. Clear to auscultation throughout. No adventitious sounds.   Abdomen: Soft, non tender, and without distention. Active bowel sounds in all four quadrants. No rebound, guarding.  Extremities: No cyanosis, clubbing, erythema, nor edema. Radial pulses intact and symmetric.   Musculoskeletal: All major joints AROM full in all directions without pain.  Neurological: Alert and oriented x 3. Grossly non-focal. Strength and sensation grossly intact.   Psychiatric:  Behavior, mood, and affect are appropriate.    Assessment and Plan:   1. Encounter for well adult exam without abnormal findings    2. Morbid obesity (HCC)  3. Body mass index (BMI) of 40.0-44.9 in adult (HCC)  Chronic, uncontrolled.  Encourage diet high in fruits, vegetables, and fiber. And a diet low " in salt, refined carbohydrates, cholesterol, saturated fat, and trans fatty acids.    Encourage a minimum of 30 minutes of moderate intensity aerobic exercise (eg, brisk walking) is recommended on five days each week. Or 30 minutes of vigorous-intensity aerobic exercise (eg, jogging) on three days each week.   Patient's body mass index is 44.87 kg/m². Exercise and nutrition counseling were performed at this visit.  Due for updated labs prior to follow-up in February 2025.  - CBC WITHOUT DIFFERENTIAL; Future  - Comp Metabolic Panel; Future  - Lipid Profile; Future  - TSH WITH REFLEX TO FT4; Future  - HEMOGLOBIN A1C; Future  - Patient identified as having weight management issue.  Appropriate orders and counseling given.    4. Mixed hyperlipidemia  Chronic, ongoing.  Discussed increased risk of ASCVD score.  Patient declines statin at this time.  Cholesterol levels were discussed, with a focus on improving HDL and lowering LDL. He was advised to incorporate more good fats, such as salmon and olive oil, into his diet and to reduce intake of fried foods and pizza. Exercise and weight loss were also recommended to improve lipid profile. Plan to repeat labs prior to follow-up in February 2025.  - Comp Metabolic Panel; Future  - Lipid Profile; Future  - TSH WITH REFLEX TO FT4; Future    5. Vitamin D deficiency  Chronic, ongoing.  Continue vitamin D2 50,000 units weekly.  Due for updated labs prior to follow-up in February 2025.  - VITAMIN D,25 HYDROXY (DEFICIENCY); Future  - vitamin D2, Ergocalciferol, (DRISDOL) 1.25 MG (18535 UT) Cap capsule; Take 1 Capsule by mouth every 7 days.  Dispense: 12 Capsule; Refill: 3    6. Idiopathic chronic gout of multiple sites without tophus  Chronic, ongoing.  Continue allopurinol 100 mg daily. Plan to repeat labs prior to follow-up in February 2025.  - URIC ACID; Future  - allopurinol (ZYLOPRIM) 100 MG Tab; Take 1 Tablet by mouth every day.  Dispense: 90 Tablet; Refill: 3    7. Other  male erectile dysfunction  Chronic, ongoing.  Continue tadalafil 5 mg once daily as needed for erectile dysfunction.  - tadalafil (CIALIS) 5 MG tablet; TAKE 1 TABLET BY MOUTH ONCE DAILY AS NEEDED FOR ERECTILE DYSFUNCTION (MAX OF 1 TABLET PER DAY)  Dispense: 10 Tablet; Refill: 11    8. Routine health maintenance  Plan to repeat labs prior to follow-up in February 2025.  - CBC WITHOUT DIFFERENTIAL; Future  - Comp Metabolic Panel; Future  - Lipid Profile; Future  - HIV AG/AB COMBO ASSAY SCREENING; Future  - TSH WITH REFLEX TO FT4; Future  - VITAMIN D,25 HYDROXY (DEFICIENCY); Future  - PROSTATE SPECIFIC AG SCREENING; Future  - HEMOGLOBIN A1C; Future  - URIC ACID; Future  - URINALYSIS; Future    9. Screening for malignant neoplasm of prostate  Plan to repeat labs prior to follow-up in February 2025.  - PROSTATE SPECIFIC AG SCREENING; Future    10. Screening for HIV without presence of risk factors  Due for one-time screening.  - HIV AG/AB COMBO ASSAY SCREENING; Future      Health maintenance: Up to date   Labs per orders  Immunizations: Up to date  Patient counseled about skin care, diet, supplements, and exercise.  Discussed  adequate intake of calcium and vitamin D, diet and exercise, colorectal cancer screening.     Follow-up: Return in about 5 months (around 2/17/2025) for Follow up Labs.     Please note that this dictation was created using voice recognition software. I have worked with consultants from the vendor as well as technical experts from [employer] to optimize the interface. I have made every reasonable attempt to correct obvious errors, but I expect that there are errors of grammar and possibly content that I did not discover before finalizing the note.

## 2024-11-05 NOTE — TELEPHONE ENCOUNTER
Joaquins mother accompanied Tj Mirza to the emergency department on 11/5/2024. They may return to work on 11/06/2024.      If you have any questions or concerns, please don't hesitate to call.      Haley Hoffman MD Was the patient seen in the last year in this department? Yes     Does patient have an active prescription for medications requested? No     Received Request Via: Pharmacy

## 2025-02-05 ENCOUNTER — HOSPITAL ENCOUNTER (OUTPATIENT)
Dept: LAB | Facility: MEDICAL CENTER | Age: 65
End: 2025-02-05
Attending: NURSE PRACTITIONER
Payer: COMMERCIAL

## 2025-02-05 DIAGNOSIS — Z00.00 ROUTINE HEALTH MAINTENANCE: ICD-10-CM

## 2025-02-05 DIAGNOSIS — Z12.5 SCREENING FOR MALIGNANT NEOPLASM OF PROSTATE: ICD-10-CM

## 2025-02-05 DIAGNOSIS — E78.2 MIXED HYPERLIPIDEMIA: ICD-10-CM

## 2025-02-05 DIAGNOSIS — Z11.4 SCREENING FOR HIV WITHOUT PRESENCE OF RISK FACTORS: ICD-10-CM

## 2025-02-05 DIAGNOSIS — E55.9 VITAMIN D DEFICIENCY: ICD-10-CM

## 2025-02-05 DIAGNOSIS — M1A.09X0 IDIOPATHIC CHRONIC GOUT OF MULTIPLE SITES WITHOUT TOPHUS: ICD-10-CM

## 2025-02-05 DIAGNOSIS — E66.01 MORBID OBESITY (HCC): ICD-10-CM

## 2025-02-05 LAB
25(OH)D3 SERPL-MCNC: 40 NG/ML (ref 30–100)
ALBUMIN SERPL BCP-MCNC: 4 G/DL (ref 3.2–4.9)
ALBUMIN/GLOB SERPL: 1.6 G/DL
ALP SERPL-CCNC: 48 U/L (ref 30–99)
ALT SERPL-CCNC: 15 U/L (ref 2–50)
ANION GAP SERPL CALC-SCNC: 10 MMOL/L (ref 7–16)
APPEARANCE UR: CLEAR
AST SERPL-CCNC: 23 U/L (ref 12–45)
BILIRUB SERPL-MCNC: 0.6 MG/DL (ref 0.1–1.5)
BILIRUB UR QL STRIP.AUTO: NEGATIVE
BUN SERPL-MCNC: 16 MG/DL (ref 8–22)
CALCIUM ALBUM COR SERPL-MCNC: 9.2 MG/DL (ref 8.5–10.5)
CALCIUM SERPL-MCNC: 9.2 MG/DL (ref 8.5–10.5)
CHLORIDE SERPL-SCNC: 106 MMOL/L (ref 96–112)
CHOLEST SERPL-MCNC: 134 MG/DL (ref 100–199)
CO2 SERPL-SCNC: 25 MMOL/L (ref 20–33)
COLOR UR: YELLOW
CREAT SERPL-MCNC: 1.12 MG/DL (ref 0.5–1.4)
ERYTHROCYTE [DISTWIDTH] IN BLOOD BY AUTOMATED COUNT: 44 FL (ref 35.9–50)
GFR SERPLBLD CREATININE-BSD FMLA CKD-EPI: 73 ML/MIN/1.73 M 2
GLOBULIN SER CALC-MCNC: 2.5 G/DL (ref 1.9–3.5)
GLUCOSE SERPL-MCNC: 91 MG/DL (ref 65–99)
GLUCOSE UR STRIP.AUTO-MCNC: NEGATIVE MG/DL
HCT VFR BLD AUTO: 41.5 % (ref 42–52)
HDLC SERPL-MCNC: 43 MG/DL
HGB BLD-MCNC: 13.3 G/DL (ref 14–18)
HIV 1+2 AB+HIV1 P24 AG SERPL QL IA: NORMAL
KETONES UR STRIP.AUTO-MCNC: ABNORMAL MG/DL
LDLC SERPL CALC-MCNC: 80 MG/DL
LEUKOCYTE ESTERASE UR QL STRIP.AUTO: NEGATIVE
MCH RBC QN AUTO: 29.1 PG (ref 27–33)
MCHC RBC AUTO-ENTMCNC: 32 G/DL (ref 32.3–36.5)
MCV RBC AUTO: 90.8 FL (ref 81.4–97.8)
MICRO URNS: ABNORMAL
NITRITE UR QL STRIP.AUTO: NEGATIVE
PH UR STRIP.AUTO: 5.5 [PH] (ref 5–8)
PLATELET # BLD AUTO: 309 K/UL (ref 164–446)
PMV BLD AUTO: 10 FL (ref 9–12.9)
POTASSIUM SERPL-SCNC: 4.8 MMOL/L (ref 3.6–5.5)
PROT SERPL-MCNC: 6.5 G/DL (ref 6–8.2)
PROT UR QL STRIP: NEGATIVE MG/DL
PSA SERPL DL<=0.01 NG/ML-MCNC: 2.92 NG/ML (ref 0–4)
RBC # BLD AUTO: 4.57 M/UL (ref 4.7–6.1)
RBC UR QL AUTO: NEGATIVE
SODIUM SERPL-SCNC: 141 MMOL/L (ref 135–145)
SP GR UR STRIP.AUTO: 1.02
TRIGL SERPL-MCNC: 54 MG/DL (ref 0–149)
TSH SERPL DL<=0.005 MIU/L-ACNC: 2.64 UIU/ML (ref 0.38–5.33)
URATE SERPL-MCNC: 7.8 MG/DL (ref 2.5–8.3)
UROBILINOGEN UR STRIP.AUTO-MCNC: 1 EU/DL
WBC # BLD AUTO: 6.5 K/UL (ref 4.8–10.8)

## 2025-02-05 PROCEDURE — 80061 LIPID PANEL: CPT

## 2025-02-05 PROCEDURE — 84550 ASSAY OF BLOOD/URIC ACID: CPT

## 2025-02-05 PROCEDURE — 82306 VITAMIN D 25 HYDROXY: CPT

## 2025-02-05 PROCEDURE — 36415 COLL VENOUS BLD VENIPUNCTURE: CPT

## 2025-02-05 PROCEDURE — 80053 COMPREHEN METABOLIC PANEL: CPT

## 2025-02-05 PROCEDURE — 83036 HEMOGLOBIN GLYCOSYLATED A1C: CPT

## 2025-02-05 PROCEDURE — 84443 ASSAY THYROID STIM HORMONE: CPT

## 2025-02-05 PROCEDURE — 85027 COMPLETE CBC AUTOMATED: CPT

## 2025-02-05 PROCEDURE — 87389 HIV-1 AG W/HIV-1&-2 AB AG IA: CPT

## 2025-02-05 PROCEDURE — 84153 ASSAY OF PSA TOTAL: CPT

## 2025-02-05 PROCEDURE — 81003 URINALYSIS AUTO W/O SCOPE: CPT

## 2025-02-07 LAB
EST. AVERAGE GLUCOSE BLD GHB EST-MCNC: 120 MG/DL
HBA1C MFR BLD: 5.8 % (ref 4–5.6)

## 2025-02-12 ENCOUNTER — OFFICE VISIT (OUTPATIENT)
Dept: MEDICAL GROUP | Facility: PHYSICIAN GROUP | Age: 65
End: 2025-02-12
Payer: COMMERCIAL

## 2025-02-12 VITALS
SYSTOLIC BLOOD PRESSURE: 120 MMHG | DIASTOLIC BLOOD PRESSURE: 70 MMHG | HEIGHT: 64 IN | HEART RATE: 70 BPM | WEIGHT: 246.4 LBS | OXYGEN SATURATION: 93 % | BODY MASS INDEX: 42.07 KG/M2 | TEMPERATURE: 98.3 F

## 2025-02-12 DIAGNOSIS — Z23 NEED FOR VACCINATION: ICD-10-CM

## 2025-02-12 DIAGNOSIS — Z80.42 FAMILY HISTORY OF PROSTATE CANCER: ICD-10-CM

## 2025-02-12 DIAGNOSIS — E78.2 MIXED HYPERLIPIDEMIA: ICD-10-CM

## 2025-02-12 DIAGNOSIS — R73.03 PREDIABETES: ICD-10-CM

## 2025-02-12 DIAGNOSIS — Z00.00 ROUTINE HEALTH MAINTENANCE: ICD-10-CM

## 2025-02-12 DIAGNOSIS — Z12.5 SCREENING FOR MALIGNANT NEOPLASM OF PROSTATE: ICD-10-CM

## 2025-02-12 DIAGNOSIS — E66.01 MORBID OBESITY (HCC): ICD-10-CM

## 2025-02-12 DIAGNOSIS — E55.9 VITAMIN D DEFICIENCY: ICD-10-CM

## 2025-02-12 PROCEDURE — 99214 OFFICE O/P EST MOD 30 MIN: CPT | Mod: 25 | Performed by: NURSE PRACTITIONER

## 2025-02-12 PROCEDURE — 90656 IIV3 VACC NO PRSV 0.5 ML IM: CPT | Performed by: NURSE PRACTITIONER

## 2025-02-12 PROCEDURE — 3078F DIAST BP <80 MM HG: CPT | Performed by: NURSE PRACTITIONER

## 2025-02-12 PROCEDURE — 90472 IMMUNIZATION ADMIN EACH ADD: CPT | Performed by: NURSE PRACTITIONER

## 2025-02-12 PROCEDURE — 90471 IMMUNIZATION ADMIN: CPT | Performed by: NURSE PRACTITIONER

## 2025-02-12 PROCEDURE — 90677 PCV20 VACCINE IM: CPT | Performed by: NURSE PRACTITIONER

## 2025-02-12 PROCEDURE — 3074F SYST BP LT 130 MM HG: CPT | Performed by: NURSE PRACTITIONER

## 2025-02-12 ASSESSMENT — FIBROSIS 4 INDEX: FIB4 SCORE: 1.23

## 2025-02-12 ASSESSMENT — PATIENT HEALTH QUESTIONNAIRE - PHQ9: CLINICAL INTERPRETATION OF PHQ2 SCORE: 0

## 2025-02-13 NOTE — PROGRESS NOTES
Verbal consent was acquired by the patient to use [employer] ambient listening note generation during this visit Yes      Subjective   Sheng Joel is a 65 y.o. male who presents for:  History of Present Illness  The patient is a 65-year-old male who presents for lab review.    He has expressed concern regarding his elevated PSA levels, particularly in light of his brother's diagnosis of prostate cancer. He reports no hematuria or hematochezia. He has been experiencing nocturia, necessitating him to urinate a couple of times during the night. He attributes this to his increased water intake due to his healthier diet. He does not express any concern about this symptom.    He has initiated a cardio exercise regimen over the past 4 to 6 weeks, aiming to reduce his blood pressure and stroke risk. His current weight loss goal is an additional 50 pounds. He has been adhering to a ketogenic diet for the past 2 weeks, consuming primarily salmon and steamed vegetables, and engaging in physical activity for 2 hours daily. He has lost 15 pounds since September 2024. He plans to incorporate jogging into his routine once he reaches a weight of 210 pounds. He has no history of fractures or other significant injuries, with the exception of a severe pneumonia episode. He donated blood a couple weeks before his lab draw.    He has a history of gambling addiction, which he has been actively addressing. He abstained from gambling for a month before relapsing with a $ 40 bet. He has been abstaining from alcohol for the past 2.5 years.    He has been informed that his colonoscopies are scheduled every 10 years, despite his brother undergoing the procedure every 5 years due to their shared family history. He has been incorporating fast walking into his exercise routine to avoid knee and ankle injuries until further weight loss is achieved.     SOCIAL HISTORY  He has not had alcohol for 2.5 years. He admits to gambling but is  "working on reducing it.    FAMILY HISTORY  His brother has prostate cancer. Both grandfathers  at a young age. His father and grandmother had cancer.    ROS:  See HPI    Objective   /70 (BP Location: Left arm, Patient Position: Sitting, BP Cuff Size: Adult)   Pulse 70   Temp 36.8 °C (98.3 °F) (Temporal)   Ht 1.626 m (5' 4\")   Wt 112 kg (246 lb 6.4 oz)   SpO2 93%   Physical Exam  General: Well nourished, well developed male in NAD, awake and conversant.  Eyes: Normal conjunctiva, anicteric.  Round symmetrical pupils.  ENT: Hearing grossly intact.  No nasal discharge.  Neck: Neck is supple.  No masses or thyromegaly.  CV: No lower extremity edema.  Respiratory: Respirations are nonlabored.  No wheezing.  Abdomen: Non-Distended.  Skin: Warm.  No rashes or ulcers.  MSK: Normal ambulation.  No clubbing or cyanosis.  Neuro: Sensation and CN II-XII grossly normal.  Psych: Alert and oriented.  Cooperative, appropriate mood and affect, normal judgment.      Results  Laboratory Studies  A1c was revised to 5.8. PSA levels have been increasing over the past 3 years, but is still in normal range. Occult blood test on 2024, was negative.    Assessment & Plan  1. Morbid obesity (HCC)  2. BMI 40.0-44.9, adult (HCC)  Chronic, ongoing. He has lost 15 pounds since 2024 and aims to lose 50 more pounds. He has been engaging in cardio exercises and following a ketogenic diet. He is advised to continue his current exercise regimen and dietary modifications to achieve his weight loss goals.  Encourage diet high in fruits, vegetables, and fiber. And a diet low in salt, refined carbohydrates, cholesterol, saturated fat, and trans fatty acids.    Encourage a minimum of 30 minutes of moderate intensity aerobic exercise (eg, brisk walking) is recommended on five days each week. Or 30 minutes of vigorous-intensity aerobic exercise (eg, jogging) on three days each week.   Patient's body mass index is 42.29 " kg/m². Exercise and nutrition counseling were performed at this visit.  Due for updated annual labs prior to annual follow up in February 2026.  - CBC WITH DIFFERENTIAL; Future  - Comp Metabolic Panel; Future  - Lipid Profile; Future  - TSH WITH REFLEX TO FT4; Future    3. Mixed hyperlipidemia  Chronic, improving without medication. Continue working on healthy diet, regular exercise, weight loss. Due for updated annual labs prior to annual follow up in February 2026.  - Comp Metabolic Panel; Future  - Lipid Profile; Future  - TSH WITH REFLEX TO FT4; Future    4. Prediabetes  Chronic, worsening without medication. Encourage healthy diet, regular exercise, weight loss. Due for updated annual labs prior to annual follow up in February 2026.  - HEMOGLOBIN A1C; Future  - Comp Metabolic Panel; Future  - Lipid Profile; Future    5. Vitamin D deficiency  Chronic, ongoing. Continue vitamin d 55796 units weekly. Due for updated annual labs prior to annual follow up in February 2026.  - VITAMIN D,25 HYDROXY (DEFICIENCY); Future    6. Family history of prostate cancer  7. Screening for malignant neoplasm of prostate  Chronic, ongoing. Given his family history of prostate cancer and the observed increase in PSA levels over the past three years, a referral to a urologist is warranted for further evaluation and requested by the patient.  Due for updated annual labs prior to annual follow up in February 2026.  - Referral to Urology  - PROSTATE SPECIFIC AG SCREENING; Future    8. Routine health maintenance  Due for updated annual labs prior to annual follow up in February 2026.  - HEMOGLOBIN A1C; Future  - CBC WITH DIFFERENTIAL; Future  - Comp Metabolic Panel; Future  - Lipid Profile; Future  - TSH WITH REFLEX TO FT4; Future  - VITAMIN D,25 HYDROXY (DEFICIENCY); Future  - PROSTATE SPECIFIC AG SCREENING; Future    9. Need for vaccination  Given today.  - Pneumococcal Conjugate Vaccine 20-Valent (6 wks+)  - INFLUENZA VACCINE TRI INJ  (PF)      Return in about 1 year (around 2/12/2026) for Preventative Annual, Follow up Labs.     I have placed the below orders and discussed them with an approved delegating provider. The MA is performing the below orders under the direction of Dr. Acuña.      Please note that this dictation was created using voice recognition software. I have made every reasonable attempt to correct obvious errors, but I expect that there are errors of grammar and possibly content that I did not discover before finalizing the note.

## 2025-02-14 NOTE — Clinical Note
REFERRAL APPROVAL NOTICE         Sent on February 14, 2025                   Sheng Joel  [address] [address]  [facility] [address]                   Dear Mr. Joel,    After a careful review of the medical information and benefit coverage, [facility] has processed your referral. See below for additional details.    If applicable, you must be actively enrolled with your insurance for coverage of the authorized service. If you have any questions regarding your coverage, please contact your insurance directly.    REFERRAL INFORMATION   Referral #:  [id number]  Referred-To Department    Referred-By Provider:  Urology    LEONELA Souza   [facility]      [address] [id number] [phone] [address]  [facility] [id number] [phone]    Referral Start Date:  02/12/2025  Referral End Date:   02/12/2026             SCHEDULING  If you do not already have an appointment, please call [phone] to make an appointment.     MORE INFORMATION  If you do not already have a [employer] account, sign up at: [email or web address]  You can access your medical information, make appointments, see lab results, billing information, and more.  If you have questions regarding this referral, please contact  the [facility] Referrals department at:             [phone]. Monday - Friday 8:00AM - 5:00PM.     Sincerely,    [facility]

## 2025-03-17 ENCOUNTER — OFFICE VISIT (OUTPATIENT)
Dept: UROLOGY | Facility: MEDICAL CENTER | Age: 65
End: 2025-03-17
Payer: COMMERCIAL

## 2025-03-17 DIAGNOSIS — R97.20 ELEVATED PSA: ICD-10-CM

## 2025-03-17 DIAGNOSIS — R68.89 ABNORMAL DIGITAL RECTAL EXAM: ICD-10-CM

## 2025-03-17 PROCEDURE — 99204 OFFICE O/P NEW MOD 45 MIN: CPT | Performed by: UROLOGY

## 2025-03-17 NOTE — PROGRESS NOTES
Chief Complaint: elevated PSA    The patient was referred by LEONELA Souza for evaluation of the above chief complaint    History of Present Illness:    Sheng Joel is a 65 y.o. male who presents today for elevated PSA.  - 2.9 ng/mL in 2/2025  - Baseline closer to <1 ng/mL for several years  - No prior prostate biopsy  - Strong family history of prostate/breast cancer (brother in his 60's, cousins and uncle; mother with metastatic breast cancer)  - No urinary symptoms  - No hematuria or UTI symptoms    Subjective    Family History   Problem Relation Age of Onset    Breast Cancer Mother     Cancer Mother         Breast cancer    Heart Disease Father     Hypertension Father     Kidney Disease Father     Cancer Father         skin cancer    Skin cancer Father     Obesity Sister     Cancer Brother         Prostate Cancer    Prostate cancer Brother     Bladder cancer Brother     Cancer Maternal Uncle         Prostate cancer    Prostate cancer Maternal Uncle     Cancer Maternal Uncle         Lung Cancer    Lung Cancer Maternal Uncle     Cancer Paternal Aunt         Brain tumors    Cancer Maternal Grandmother         Colon cancer    Colon Cancer Maternal Grandmother     Heart Attack Maternal Grandfather 45    No Known Problems Paternal Grandmother     Stroke Paternal Grandfather     No Known Problems Son     Prostate cancer Cousin     Stroke Other      Social History     Socioeconomic History    Marital status:      Spouse name: Not on file    Number of children: 1    Years of education: Not on file    Highest education level: Bachelor's degree (e.g., BA, AB, BS)   Occupational History     Employer: [employer]   Tobacco Use    Smoking status: Former     Types: Cigars    Smokeless tobacco: Former     Types: Chew     Quit date: 1/3/2012    Tobacco comments:     formerly smoked a few cigars per year   Vaping Use    Vaping status: Never Used   Substance and Sexual Activity    Alcohol use: Not  Currently     Alcohol/week: 3.6 oz     Comment: no Alcohol since July 2022    Drug use: Never    Sexual activity: Yes     Partners: Female     Birth control/protection: Surgical, Condom Male   Other Topics Concern    Not on file   Social History Narrative    Not on file     Social Drivers of Health     Financial Resource Strain: Low Risk  (9/9/2024)    Overall Financial Resource Strain (CARDIA)     Difficulty of Paying Living Expenses: Not very hard   Food Insecurity: No Food Insecurity (9/9/2024)    Hunger Vital Sign     Worried About Running Out of Food in the Last Year: Never true     Ran Out of Food in the Last Year: Never true   Transportation Needs: No Transportation Needs (9/9/2024)    PRAPARE - Transportation     Lack of Transportation (Medical): No     Lack of Transportation (Non-Medical): No   Physical Activity: Unknown (9/9/2024)    Exercise Vital Sign     Days of Exercise per Week: Not on file     Minutes of Exercise per Session: 150+ min   Stress: No Stress Concern Present (9/9/2024)    [employer] [employer] - Occupational Stress Questionnaire     Feeling of Stress : Only a little   Social Connections: Moderately Isolated (9/9/2024)    Social Connection and Isolation Panel [NHANES]     Frequency of Communication with Friends and Family: More than three times a week     Frequency of Social Gatherings with Friends and Family: Once a week     Attends Ep[employer] Services: 1 to 4 times per year     Active Member of Clubs or Organizations: No     Attends Club or Organization Meetings: Never     Marital Status:    Intimate Partner Violence: Not on file   Housing Stability: Low Risk  (9/9/2024)    Housing Stability Vital Sign     Unable to Pay for Housing in the Last Year: No     Number of Times Moved in the Last Year: 0     Homeless in the Last Year: No     Past Surgical History:   Procedure Laterality Date    HERNIA REPAIR      VASECTOMY       Past Medical History:   Diagnosis Date     COVID-19 02/01/2021    Elevated hemoglobin A1c 01/05/2019    Gout     Hyperlipidemia     Hypertension     Obesity, morbid, BMI 40.0-49.9 (HCC)     Pneumonia     was hospitalized for this     Current Outpatient Medications   Medication Sig Dispense Refill    allopurinol (ZYLOPRIM) 100 MG Tab Take 1 Tablet by mouth every day. 90 Tablet 3    vitamin D2, Ergocalciferol, (DRISDOL) 1.25 MG (21978 UT) Cap capsule Take 1 Capsule by mouth every 7 days. 12 Capsule 3    INDOMETHACIN PO Take  by mouth as needed. Gout flare      Multiple Vitamins-Minerals (MULTI FOR HIM) Cap Take  by mouth.       No current facility-administered medications for this visit.     No Known Allergies    Review of systems was conducted and was negative except for as explicitly listed in the History of Present Illness.       Objective   There were no vitals taken for this visit.  Physical Exam  Vitals reviewed.   HENT:      Head: Normocephalic.      Nose: Nose normal.      Mouth/Throat:      Pharynx: Oropharynx is clear.   Eyes:      Conjunctiva/sclera: Conjunctivae normal.   Cardiovascular:      Rate and Rhythm: Normal rate.      Pulses: Normal pulses.   Pulmonary:      Effort: Pulmonary effort is normal.   Abdominal:      Palpations: Abdomen is soft.   Genitourinary:     Comments: DARRIUS with abnormal prostate texture, firm  Musculoskeletal:         General: Normal range of motion.      Cervical back: Neck supple.   Skin:     General: Skin is warm.   Neurological:      Mental Status: He is alert. Mental status is at baseline.   Psychiatric:         Mood and Affect: Mood normal.         Lab/Radiology/Diagnostic Review:  PSA   Lab Results   Component Value Date/Time    PSATOTAL 2.92 02/05/2025 0616     I have reviewed and independently examined the lab results.  My findings are given in the HPI or above with the associated tests.        Assessment & Plan    It was a pleasure speaking with Sheng Joel today.    Sheng Joel is a 65  y.o. male w/ elevated PSA  - Discussed AUA and NCCN guidelines with the patient  - Discussed benign and malignant causes of elevated PSA  - Discussed the controversies associated with PSA screening    - Discussed biomarker testing, specifically 4Kscore to adjudicate his PSA elevation  - Discussed limited biomarker data for relatively low PSA patients    - Given strong family history and abnormal DARRIUS, we discussed mpMRI to determine risk of underlying tumor, which will improve biopsy yield  - Discussed biopsy as standard of care  - Discussed risks and tradeoffs with office TRUS biopsy versus OR transperineal biopsy, specifically risks of sepsis with transrectal procedures as well as lower diagnostic yield (~historic 25-30% upgrading rate on subsequent pathology)    - Plan for MRI

## 2025-04-16 ENCOUNTER — HOSPITAL ENCOUNTER (OUTPATIENT)
Dept: RADIOLOGY | Facility: MEDICAL CENTER | Age: 65
End: 2025-04-16
Attending: UROLOGY
Payer: COMMERCIAL

## 2025-04-16 DIAGNOSIS — R68.89 ABNORMAL DIGITAL RECTAL EXAM: ICD-10-CM

## 2025-04-16 DIAGNOSIS — R97.20 ELEVATED PSA: ICD-10-CM

## 2025-04-16 PROCEDURE — A9579 GAD-BASE MR CONTRAST NOS,1ML: HCPCS | Mod: JZ | Performed by: UROLOGY

## 2025-04-16 PROCEDURE — 700111 HCHG RX REV CODE 636 W/ 250 OVERRIDE (IP): Mod: JZ | Performed by: RADIOLOGY

## 2025-04-16 PROCEDURE — 700117 HCHG RX CONTRAST REV CODE 255: Mod: JZ | Performed by: UROLOGY

## 2025-04-16 PROCEDURE — 72197 MRI PELVIS W/O & W/DYE: CPT

## 2025-04-16 RX ADMIN — GLUCAGON 1 MG: 1 INJECTION, POWDER, LYOPHILIZED, FOR SOLUTION INTRAMUSCULAR; INTRAVENOUS at 07:28

## 2025-04-16 RX ADMIN — GADOTERIDOL 20 ML: 279.3 INJECTION, SOLUTION INTRAVENOUS at 08:01

## 2025-04-17 ENCOUNTER — RESULTS FOLLOW-UP (OUTPATIENT)
Dept: UROLOGY | Facility: MEDICAL CENTER | Age: 65
End: 2025-04-17

## 2025-04-18 ENCOUNTER — APPOINTMENT (OUTPATIENT)
Dept: ADMISSIONS | Facility: MEDICAL CENTER | Age: 65
End: 2025-04-18
Attending: UROLOGY
Payer: COMMERCIAL

## 2025-04-30 ENCOUNTER — PRE-ADMISSION TESTING (OUTPATIENT)
Dept: ADMISSIONS | Facility: MEDICAL CENTER | Age: 65
End: 2025-04-30
Attending: UROLOGY
Payer: COMMERCIAL

## 2025-04-30 RX ORDER — DIPHENHYDRAMINE HCL 25 MG
25 TABLET ORAL EVERY 6 HOURS PRN
COMMUNITY

## 2025-04-30 NOTE — PREADMIT AVS NOTE
Current Medications   Medication Instructions    diphenhydrAMINE (BENADRYL) 25 MG Tab As needed medication, may take if needed, including morning of procedure     allopurinol (ZYLOPRIM) 100 MG Tab Continue taking medication as prescribed, including morning of procedure     vitamin D2, Ergocalciferol, (DRISDOL) 1.25 MG (02164 UT) Cap capsule Stop 7 days before surgery    INDOMETHACIN PO Continue taking medication as prescribed, including morning of procedure     Multiple Vitamins-Minerals (MULTI FOR HIM) Cap Stop 7 days before surgery

## 2025-05-06 ENCOUNTER — TELEPHONE (OUTPATIENT)
Dept: UROLOGY | Facility: MEDICAL CENTER | Age: 65
End: 2025-05-06
Payer: COMMERCIAL

## 2025-05-06 ENCOUNTER — ANESTHESIA EVENT (OUTPATIENT)
Dept: SURGERY | Facility: MEDICAL CENTER | Age: 65
End: 2025-05-06
Payer: COMMERCIAL

## 2025-05-07 ENCOUNTER — HOSPITAL ENCOUNTER (OUTPATIENT)
Facility: MEDICAL CENTER | Age: 65
End: 2025-05-07
Attending: UROLOGY | Admitting: UROLOGY
Payer: COMMERCIAL

## 2025-05-07 ENCOUNTER — ANESTHESIA (OUTPATIENT)
Dept: SURGERY | Facility: MEDICAL CENTER | Age: 65
End: 2025-05-07
Payer: COMMERCIAL

## 2025-05-07 VITALS
TEMPERATURE: 97.2 F | OXYGEN SATURATION: 96 % | RESPIRATION RATE: 16 BRPM | HEIGHT: 67 IN | DIASTOLIC BLOOD PRESSURE: 88 MMHG | WEIGHT: 254.19 LBS | BODY MASS INDEX: 39.9 KG/M2 | HEART RATE: 68 BPM | SYSTOLIC BLOOD PRESSURE: 164 MMHG

## 2025-05-07 LAB
EKG IMPRESSION: NORMAL
PATHOLOGY CONSULT NOTE: NORMAL

## 2025-05-07 PROCEDURE — 160025 RECOVERY II MINUTES (STATS): Performed by: UROLOGY

## 2025-05-07 PROCEDURE — 160009 HCHG ANES TIME/MIN: Performed by: UROLOGY

## 2025-05-07 PROCEDURE — 700111 HCHG RX REV CODE 636 W/ 250 OVERRIDE (IP): Performed by: STUDENT IN AN ORGANIZED HEALTH CARE EDUCATION/TRAINING PROGRAM

## 2025-05-07 PROCEDURE — 700101 HCHG RX REV CODE 250: Performed by: STUDENT IN AN ORGANIZED HEALTH CARE EDUCATION/TRAINING PROGRAM

## 2025-05-07 PROCEDURE — 160035 HCHG PACU - 1ST 60 MINS PHASE I: Performed by: UROLOGY

## 2025-05-07 PROCEDURE — 160048 HCHG OR STATISTICAL LEVEL 1-5: Performed by: UROLOGY

## 2025-05-07 PROCEDURE — 160028 HCHG SURGERY MINUTES - 1ST 30 MINS LEVEL 3: Performed by: UROLOGY

## 2025-05-07 PROCEDURE — G0416 PROSTATE BIOPSY, ANY MTHD: HCPCS | Performed by: PATHOLOGY

## 2025-05-07 PROCEDURE — 88305 TISSUE EXAM BY PATHOLOGIST: CPT | Mod: 59 | Performed by: PATHOLOGY

## 2025-05-07 PROCEDURE — 88363 XM ARCHIVE TISSUE MOLEC ANAL: CPT | Performed by: PATHOLOGY

## 2025-05-07 PROCEDURE — 160002 HCHG RECOVERY MINUTES (STAT): Performed by: UROLOGY

## 2025-05-07 PROCEDURE — 160046 HCHG PACU - 1ST 60 MINS PHASE II: Performed by: UROLOGY

## 2025-05-07 PROCEDURE — 160039 HCHG SURGERY MINUTES - EA ADDL 1 MIN LEVEL 3: Performed by: UROLOGY

## 2025-05-07 PROCEDURE — 160015 HCHG STAT PREOP MINUTES: Performed by: UROLOGY

## 2025-05-07 PROCEDURE — 55700 PR BIOPSY OF PROSTATE,NEEDLE/PUNCH: CPT | Performed by: UROLOGY

## 2025-05-07 PROCEDURE — 76872 US TRANSRECTAL: CPT | Performed by: UROLOGY

## 2025-05-07 PROCEDURE — 110371 HCHG SHELL REV 272: Performed by: UROLOGY

## 2025-05-07 PROCEDURE — 93010 ELECTROCARDIOGRAM REPORT: CPT | Performed by: INTERNAL MEDICINE

## 2025-05-07 PROCEDURE — 700111 HCHG RX REV CODE 636 W/ 250 OVERRIDE (IP): Mod: JZ | Performed by: UROLOGY

## 2025-05-07 PROCEDURE — 93005 ELECTROCARDIOGRAM TRACING: CPT | Mod: TC | Performed by: UROLOGY

## 2025-05-07 PROCEDURE — 700105 HCHG RX REV CODE 258: Performed by: UROLOGY

## 2025-05-07 RX ORDER — HYDRALAZINE HYDROCHLORIDE 20 MG/ML
5 INJECTION INTRAMUSCULAR; INTRAVENOUS
Status: DISCONTINUED | OUTPATIENT
Start: 2025-05-07 | End: 2025-05-07 | Stop reason: HOSPADM

## 2025-05-07 RX ORDER — SODIUM CHLORIDE, SODIUM LACTATE, POTASSIUM CHLORIDE, CALCIUM CHLORIDE 600; 310; 30; 20 MG/100ML; MG/100ML; MG/100ML; MG/100ML
INJECTION, SOLUTION INTRAVENOUS CONTINUOUS
Status: DISCONTINUED | OUTPATIENT
Start: 2025-05-07 | End: 2025-05-07 | Stop reason: HOSPADM

## 2025-05-07 RX ORDER — METHOCARBAMOL 100 MG/ML
1000 INJECTION, SOLUTION INTRAMUSCULAR; INTRAVENOUS
Status: DISCONTINUED | OUTPATIENT
Start: 2025-05-07 | End: 2025-05-07 | Stop reason: HOSPADM

## 2025-05-07 RX ORDER — OXYCODONE HCL 5 MG/5 ML
5 SOLUTION, ORAL ORAL
Status: DISCONTINUED | OUTPATIENT
Start: 2025-05-07 | End: 2025-05-07 | Stop reason: HOSPADM

## 2025-05-07 RX ORDER — LIDOCAINE HYDROCHLORIDE 20 MG/ML
INJECTION, SOLUTION EPIDURAL; INFILTRATION; INTRACAUDAL; PERINEURAL PRN
Status: DISCONTINUED | OUTPATIENT
Start: 2025-05-07 | End: 2025-05-07 | Stop reason: SURG

## 2025-05-07 RX ORDER — LIDOCAINE HYDROCHLORIDE 10 MG/ML
INJECTION, SOLUTION EPIDURAL; INFILTRATION; INTRACAUDAL; PERINEURAL
Status: DISCONTINUED | OUTPATIENT
Start: 2025-05-07 | End: 2025-05-07 | Stop reason: HOSPADM

## 2025-05-07 RX ORDER — HALOPERIDOL 5 MG/ML
1 INJECTION INTRAMUSCULAR
Status: DISCONTINUED | OUTPATIENT
Start: 2025-05-07 | End: 2025-05-07 | Stop reason: HOSPADM

## 2025-05-07 RX ORDER — DIPHENHYDRAMINE HYDROCHLORIDE 50 MG/ML
12.5 INJECTION, SOLUTION INTRAMUSCULAR; INTRAVENOUS
Status: DISCONTINUED | OUTPATIENT
Start: 2025-05-07 | End: 2025-05-07 | Stop reason: HOSPADM

## 2025-05-07 RX ORDER — HYDROMORPHONE HYDROCHLORIDE 1 MG/ML
0.4 INJECTION, SOLUTION INTRAMUSCULAR; INTRAVENOUS; SUBCUTANEOUS
Status: DISCONTINUED | OUTPATIENT
Start: 2025-05-07 | End: 2025-05-07 | Stop reason: HOSPADM

## 2025-05-07 RX ORDER — ALBUTEROL SULFATE 5 MG/ML
2.5 SOLUTION RESPIRATORY (INHALATION)
Status: DISCONTINUED | OUTPATIENT
Start: 2025-05-07 | End: 2025-05-07 | Stop reason: HOSPADM

## 2025-05-07 RX ORDER — HYDROMORPHONE HYDROCHLORIDE 1 MG/ML
0.1 INJECTION, SOLUTION INTRAMUSCULAR; INTRAVENOUS; SUBCUTANEOUS
Status: DISCONTINUED | OUTPATIENT
Start: 2025-05-07 | End: 2025-05-07 | Stop reason: HOSPADM

## 2025-05-07 RX ORDER — CEFAZOLIN SODIUM 1 G/3ML
INJECTION, POWDER, FOR SOLUTION INTRAMUSCULAR; INTRAVENOUS PRN
Status: DISCONTINUED | OUTPATIENT
Start: 2025-05-07 | End: 2025-05-07 | Stop reason: SURG

## 2025-05-07 RX ORDER — DEXAMETHASONE SODIUM PHOSPHATE 4 MG/ML
INJECTION, SOLUTION INTRA-ARTICULAR; INTRALESIONAL; INTRAMUSCULAR; INTRAVENOUS; SOFT TISSUE PRN
Status: DISCONTINUED | OUTPATIENT
Start: 2025-05-07 | End: 2025-05-07 | Stop reason: SURG

## 2025-05-07 RX ORDER — ONDANSETRON 2 MG/ML
INJECTION INTRAMUSCULAR; INTRAVENOUS PRN
Status: DISCONTINUED | OUTPATIENT
Start: 2025-05-07 | End: 2025-05-07 | Stop reason: SURG

## 2025-05-07 RX ORDER — ONDANSETRON 2 MG/ML
4 INJECTION INTRAMUSCULAR; INTRAVENOUS
Status: DISCONTINUED | OUTPATIENT
Start: 2025-05-07 | End: 2025-05-07 | Stop reason: HOSPADM

## 2025-05-07 RX ORDER — LIDOCAINE HYDROCHLORIDE 10 MG/ML
INJECTION, SOLUTION EPIDURAL; INFILTRATION; INTRACAUDAL; PERINEURAL
Status: DISCONTINUED
Start: 2025-05-07 | End: 2025-05-07 | Stop reason: HOSPADM

## 2025-05-07 RX ORDER — OXYCODONE HCL 5 MG/5 ML
10 SOLUTION, ORAL ORAL
Status: DISCONTINUED | OUTPATIENT
Start: 2025-05-07 | End: 2025-05-07 | Stop reason: HOSPADM

## 2025-05-07 RX ORDER — EPHEDRINE SULFATE 50 MG/ML
INJECTION, SOLUTION INTRAVENOUS PRN
Status: DISCONTINUED | OUTPATIENT
Start: 2025-05-07 | End: 2025-05-07 | Stop reason: SURG

## 2025-05-07 RX ORDER — EPHEDRINE SULFATE 50 MG/ML
5 INJECTION, SOLUTION INTRAVENOUS
Status: DISCONTINUED | OUTPATIENT
Start: 2025-05-07 | End: 2025-05-07 | Stop reason: HOSPADM

## 2025-05-07 RX ORDER — HYDROMORPHONE HYDROCHLORIDE 1 MG/ML
0.2 INJECTION, SOLUTION INTRAMUSCULAR; INTRAVENOUS; SUBCUTANEOUS
Status: DISCONTINUED | OUTPATIENT
Start: 2025-05-07 | End: 2025-05-07 | Stop reason: HOSPADM

## 2025-05-07 RX ORDER — LABETALOL HYDROCHLORIDE 5 MG/ML
5 INJECTION, SOLUTION INTRAVENOUS
Status: DISCONTINUED | OUTPATIENT
Start: 2025-05-07 | End: 2025-05-07 | Stop reason: HOSPADM

## 2025-05-07 RX ADMIN — CEFAZOLIN 2 G: 1 INJECTION, POWDER, FOR SOLUTION INTRAMUSCULAR; INTRAVENOUS at 11:07

## 2025-05-07 RX ADMIN — ONDANSETRON 4 MG: 2 INJECTION INTRAMUSCULAR; INTRAVENOUS at 11:07

## 2025-05-07 RX ADMIN — FENTANYL CITRATE 50 MCG: 50 INJECTION, SOLUTION INTRAMUSCULAR; INTRAVENOUS at 11:15

## 2025-05-07 RX ADMIN — SODIUM CHLORIDE, POTASSIUM CHLORIDE, SODIUM LACTATE AND CALCIUM CHLORIDE: 600; 310; 30; 20 INJECTION, SOLUTION INTRAVENOUS at 10:58

## 2025-05-07 RX ADMIN — PROPOFOL 250 MG: 10 INJECTION, EMULSION INTRAVENOUS at 11:04

## 2025-05-07 RX ADMIN — EPHEDRINE SULFATE 10 MG: 50 INJECTION, SOLUTION INTRAVENOUS at 11:09

## 2025-05-07 RX ADMIN — EPHEDRINE SULFATE 5 MG: 50 INJECTION, SOLUTION INTRAVENOUS at 11:23

## 2025-05-07 RX ADMIN — FENTANYL CITRATE 50 MCG: 50 INJECTION, SOLUTION INTRAMUSCULAR; INTRAVENOUS at 11:02

## 2025-05-07 RX ADMIN — LIDOCAINE HYDROCHLORIDE 100 MG: 20 INJECTION, SOLUTION EPIDURAL; INFILTRATION; INTRACAUDAL; PERINEURAL at 11:04

## 2025-05-07 RX ADMIN — DEXAMETHASONE SODIUM PHOSPHATE 8 MG: 4 INJECTION INTRA-ARTICULAR; INTRALESIONAL; INTRAMUSCULAR; INTRAVENOUS; SOFT TISSUE at 11:07

## 2025-05-07 ASSESSMENT — FIBROSIS 4 INDEX: FIB4 SCORE: 1.23

## 2025-05-07 ASSESSMENT — PAIN DESCRIPTION - PAIN TYPE
TYPE: SURGICAL PAIN
TYPE: SURGICAL PAIN

## 2025-05-07 NOTE — OP REPORT
SURGEON: Dr. Merlin Garcia        PREOPERATIVE DIAGNOSIS: Elevated PSA     POSTOPERATIVE DIAGNOSIS: Elevated PSA     PROCEDURE PERFORMED: Transperineal prostate biopsy (limited sampling)     ANESTHESIA: MAC     FLUID: See anesthesia report      ESTIMATED BLOOD LOSS: Minimal     SPECIMENS REMOVED:   Right posterior medial  Right posterior lateral  Right anterior medial  Left posterior medial  Left posterior lateral  Left anterior medial  Region of interest     TUBES, LINES AND DRAINS:    None     INDICATIONS FOR PROCEDURE: Sheng Joel is a 65 y.o. y.o. male with elevated PSA who presents for prostate biopsy.  The patient understands the main risks to be infection, bleeding, damage to surrounding structures.     DESCRIPTION OF PROCEDURE: Informed consent was obtained. The patient was properly identified and anesthesia was provided.  The patient was placed in dorsal lithotomy position and prepped and draped in the standard sterile fashion. All the pressure points were confirmed to be padded. A time-out was then conducted with all parties in agreement.  Transrectal ultrasound was performed in both axial and sagittal views.  The prostate measured approximately 3.6 x 3.8 x 5.9 cm in size for estimated volume of 26g.  The MRI lesion was cognitively fused to the ultrasound image in real time.  The perineum was infiltrated with local anesthetic.  The transperineal access device was placed into the perineum.  The MRI suspicious lesion was sampled with 4 image-targeted samples.  The specimen was handed off as the region of interest.  The remainder of the prostate was sampled in limited fashion with 1-2 cores per standard 6 region systematic sampling.  Once completed, the probe was removed and pressure was applied to the perineum. The patient was awoken and taken to post-operative anesthesia recovery.     DISPOSITION: We will call the patient with the biopsy results.

## 2025-05-07 NOTE — ANESTHESIA PROCEDURE NOTES
Airway    Date/Time: 5/7/2025 11:06 AM    Performed by: Haydee Rooney M.D.  Authorized by: Haydee Rooney M.D.    Location:  OR  Urgency:  Elective  Indications for Airway Management:  Anesthesia      Spontaneous Ventilation: absent    Sedation Level:  Deep  Preoxygenated: Yes    Mask Difficulty Assessment:  3 - difficult mask (inadequate, unstable or two providers) +/- NMBA  Final Airway Type:  Supraglottic airway  Final Supraglottic Airway:  Standard LMA    SGA Size:  4  Number of Attempts at Approach:  1

## 2025-05-07 NOTE — OR NURSING
1135 Patient arrived from OR to PACU 3. Connected to monitor and report received from anesthesia and RN. VSS. 6 L 02 via mask. No airway in place. Breaths calm, even, unlabored.     1140: Room air. Pt denies pain and nausea    1157: Discharge instructions provided to pt and pt's friend at bedside    1210: Pt up to restroom; voided and dressed    1214:Pt meets discharge criteria. PIV removed intact. Pt escorted out with all belongings via wheelchair

## 2025-05-07 NOTE — DISCHARGE INSTRUCTIONS
If any questions arise, call your provider.  If your provider is not available, please feel free to call the Surgical Center at [phone].    MEDICATIONS: Resume taking daily medication.  Take prescribed pain medication with food.  If no medication is prescribed, you may take non-aspirin pain medication if needed.  PAIN MEDICATION CAN BE VERY CONSTIPATING.  Take a stool softener or laxative such as senokot, pericolace, or milk of magnesia if needed.    Last pain medication given:      What to Expect Post Anesthesia    Rest and take it easy for the first 24 hours.  A responsible adult is recommended to remain with you during that time.  It is normal to feel sleepy.  We encourage you to not do anything that requires balance, judgment or coordination.    FOR 24 HOURS DO NOT:  Drive, operate machinery or run household appliances.  Drink beer or alcoholic beverages.  Make important decisions or sign legal documents.    To avoid nausea, slowly advance diet as tolerated, avoiding spicy or greasy foods for the first day.  Add more substantial food to your diet according to your provider's instructions.  Babies can be fed formula or breast milk as soon as they are hungry.  INCREASE FLUIDS AND FIBER TO AVOID CONSTIPATION.    MILD FLU-LIKE SYMPTOMS ARE NORMAL.  YOU MAY EXPERIENCE GENERALIZED MUSCLE ACHES, THROAT IRRITATION, HEADACHE AND/OR SOME NAUSEA.

## 2025-05-07 NOTE — ANESTHESIA PREPROCEDURE EVALUATION
Case: [id number] Date/Time: 05/07/25 1015    Procedure: TRANSPERINEAL PROSTATE BIOPSY    Pre-op diagnosis: ELEVATED PSA    Location: [facility] ROOM 27 / SURGERY SAME DAY [facility]    Surgeons: Merlin Garcia M.D.            65M BMI 42, HL, L carotid bruit, PreDM, gout    Relevant Problems   Other   (positive) Chronic idiopathic gout of multiple sites   (positive) Left carotid bruit   (positive) Mixed hyperlipidemia   (positive) Morbid obesity (HCC)       Physical Exam    Airway   Mallampati: II  TM distance: >3 FB  Neck ROM: full       Cardiovascular - normal exam  Rhythm: regular  Rate: normal  (-) murmur     Dental - normal exam           Pulmonary - normal exam     Abdominal    Neurological - normal exam                   Anesthesia Plan    ASA 3   ASA physical status 3 criteria: morbid obesity - BMI greater than or equal to 40    Plan - general       Airway plan will be LMA          Induction: intravenous    Postoperative Plan: Postoperative administration of opioids is intended.    Pertinent diagnostic labs and testing reviewed    Informed Consent:    Anesthetic plan and risks discussed with patient.    Use of blood products discussed with: patient whom consented to blood products.

## 2025-05-07 NOTE — ANESTHESIA POSTPROCEDURE EVALUATION
Patient: Sheng Joel    Procedure Summary       Date: 05/07/25 Room / Location: [facility] ROOM 27 / SURGERY SAME DAY [facility]    Anesthesia Start: 1057 Anesthesia Stop: 1140    Procedure: TRANSPERINEAL PROSTATE BIOPSY (Prostate) Diagnosis: (ELEVATED PSA)    Surgeons: Merlin Garcia M.D. Responsible Provider: Haydee Rooney M.D.    Anesthesia Type: general ASA Status: 3            Final Anesthesia Type: general  Last vitals  BP   Blood Pressure: (!) 164/88    Temp   36.2 °C (97.2 °F)    Pulse   68   Resp   16    SpO2   96 %      Anesthesia Post Evaluation    Patient location during evaluation: PACU  Patient participation: complete - patient participated  Level of consciousness: awake and alert    Airway patency: patent  Anesthetic complications: no  Cardiovascular status: hemodynamically stable  Respiratory status: acceptable  Hydration status: euvolemic    PONV: none          No notable events documented.     Nurse Pain Score: 0 (NPRS)

## 2025-05-07 NOTE — ANESTHESIA TIME REPORT
Anesthesia Start and Stop Event Times       Date Time Event    5/7/2025 1042 Ready for Procedure     1057 Anesthesia Start     1140 Anesthesia Stop          Responsible Staff  05/07/25      Name Role Begin End    Haydee Rooney M.D. Anesth 1057 1140          Overtime Reason:  no overtime (within assigned shift)    Comments:

## 2025-05-07 NOTE — H&P
Assessment & Plan       65 year old M w/ elevated PSA presents for transperineal (limited) prostate biopsy.    1. Discussed the risk of surgery including bleeding and infection,  and the risks of general anesthetic including MI, CVA, sudden death or even reaction to anesthetic medications. The patient understands the risks, any and all questions were answered to the patient's satisfaction.    Alize Patricio is a 65 y.o. male who presents for evaluation of his elevated PSA.    History Review:      Past Surgical History:   Procedure Laterality Date    HERNIA REPAIR      VASECTOMY         Past Medical History:   Diagnosis Date    COVID-19 02/01/2021    Elevated hemoglobin A1c 01/05/2019    Gout     Hyperlipidemia     Hypertension     Obesity, morbid, BMI 40.0-49.9 (HCC)     Pneumonia     was hospitalized for this       Family History   Problem Relation Age of Onset    Breast Cancer Mother     Cancer Mother         Breast cancer    Heart Disease Father     Hypertension Father     Kidney Disease Father     Cancer Father         skin cancer    Skin cancer Father     Obesity Sister     Cancer Brother         Prostate Cancer    Prostate cancer Brother     Bladder cancer Brother     Cancer Maternal Uncle         Prostate cancer    Prostate cancer Maternal Uncle     Cancer Maternal Uncle         Lung Cancer    Lung Cancer Maternal Uncle     Cancer Paternal Aunt         Brain tumors    Cancer Maternal Grandmother         Colon cancer    Colon Cancer Maternal Grandmother     Heart Attack Maternal Grandfather 45    No Known Problems Paternal Grandmother     Stroke Paternal Grandfather     No Known Problems Son     Prostate cancer Cousin     Stroke Other        Social History     Tobacco Use    Smoking status: Former     Types: Cigars    Smokeless tobacco: Former     Types: Chew     Quit date: 1/3/2012    Tobacco comments:     formerly smoked a few cigars per year   Vaping Use    Vaping status: Never Used   Substance Use  "Topics    Alcohol use: Not Currently     Alcohol/week: 3.6 oz     Comment: no Alcohol since July 2022    Drug use: Never       Review of Systems   All other systems reviewed and are negative.      Objective     PSA 2.9   MRI with PIRADS 3 lesion    BP (!) 161/83   Pulse 62   Temp 35.9 °C (96.6 °F) (Temporal)   Resp 16   Ht 1.702 m (5' 7\")   Wt 115 kg (254 lb 3.1 oz)   SpO2 93%   BMI 39.81 kg/m²     Patient has no known allergies.    Physical Exam  Vitals and nursing note reviewed.   HENT:      Head: Normocephalic.      Nose: Nose normal.      Mouth/Throat:      Pharynx: Oropharynx is clear.   Eyes:      Conjunctiva/sclera: Conjunctivae normal.   Cardiovascular:      Rate and Rhythm: Normal rate.   Pulmonary:      Effort: Pulmonary effort is normal.   Abdominal:      Palpations: Abdomen is soft.   Musculoskeletal:         General: Normal range of motion.      Cervical back: Neck supple.   Skin:     General: Skin is warm.      Capillary Refill: Capillary refill takes less than 2 seconds.   Neurological:      General: No focal deficit present.      Mental Status: He is alert and oriented to person, place, and time.   Psychiatric:         Mood and Affect: Mood normal.           "

## 2025-05-08 ENCOUNTER — RESEARCH ENCOUNTER (OUTPATIENT)
Facility: MEDICAL CENTER | Age: 65
End: 2025-05-08
Payer: COMMERCIAL

## 2025-05-08 NOTE — RESEARCH NOTE
Screening/Consent note:  Participation in the [id number] clinical study was discussed with the patient on date [id number] via a phone call and a copy of the ICF sent via [employer]. All aspects of the study purpose and procedures were explained. They were given ample time to review the consent and all questions were answered to their satisfaction. Patient aware that the clinical study is voluntary and they may withdraw consent at any time without affecting the level of care they receive.Today, the consent process was repeated to obtain formal, written consent on the ICF. Subject signed consent without coercion and undue influence and was given a copy of the signed consent. No study-related procedures took place prior to consenting and all assessments were conducted per protocol.      Day of procedure note:  OR staff collected patient's height and weight for BMI calculation as part of pre-procedure activities. Once the procedure started, The [facility] Ultrasound Data Acquisition Form CRF and the [facility] Participant Information Form CRF. After all study activities were completed, the [facility] End of Study Form CRF was then completed. All ultrasound imaging was uploaded to [employer] via encrypted drive. Soiled probes were transported to the 7th floor [facility] for sanitization via [employer] cleaning. Sanitization was documented in Urology Clinic log book. Sanitized probes were then transported to the 8th floor [facility] space for storage.      Participant will be issued $150 stipend payment in coming weeks using completed W-9 form. NO - .

## 2025-05-09 ENCOUNTER — RESULTS FOLLOW-UP (OUTPATIENT)
Dept: UROLOGY | Facility: MEDICAL CENTER | Age: 65
End: 2025-05-09

## 2025-05-29 ENCOUNTER — TELEPHONE (OUTPATIENT)
Dept: UROLOGY | Facility: MEDICAL CENTER | Age: 65
End: 2025-05-29
Payer: COMMERCIAL

## 2025-06-02 ENCOUNTER — OFFICE VISIT (OUTPATIENT)
Dept: UROLOGY | Facility: MEDICAL CENTER | Age: 65
End: 2025-06-02
Payer: COMMERCIAL

## 2025-06-02 ENCOUNTER — PATIENT MESSAGE (OUTPATIENT)
Dept: ONCOLOGY | Facility: MEDICAL CENTER | Age: 65
End: 2025-06-02
Payer: COMMERCIAL

## 2025-06-02 DIAGNOSIS — C61 PROSTATE CANCER (HCC): Primary | ICD-10-CM

## 2025-06-02 PROCEDURE — 99214 OFFICE O/P EST MOD 30 MIN: CPT | Performed by: UROLOGY

## 2025-06-02 ASSESSMENT — LIFESTYLE VARIABLES
TOBACCO_USE: NO
SMOKING_STATUS: NO

## 2025-06-02 NOTE — PROGRESS NOTES
Chief Complaint: prostate cancer    History of Present Illness:    Sheng Joel is a 65 y.o. male who presents today for follow-up.  - PSA 2.9 ng/mL  - Strong family history  - MRI PIRADS 3 lesion  - Biopsy with GG3  - Decipher 0.90    Subjective    Family History   Problem Relation Age of Onset    Breast Cancer Mother     Cancer Mother         Breast cancer    Heart Disease Father     Hypertension Father     Kidney Disease Father     Cancer Father         skin cancer    Skin cancer Father     Obesity Sister     Cancer Brother         Prostate Cancer    Prostate cancer Brother     Bladder cancer Brother     Cancer Maternal Uncle         Prostate cancer    Prostate cancer Maternal Uncle     Cancer Maternal Uncle         Lung Cancer    Lung Cancer Maternal Uncle     Cancer Paternal Aunt         Brain tumors    Cancer Maternal Grandmother         Colon cancer    Colon Cancer Maternal Grandmother     Heart Attack Maternal Grandfather 45    No Known Problems Paternal Grandmother     Stroke Paternal Grandfather     No Known Problems Son     Prostate cancer Cousin     Stroke Other      Social History     Socioeconomic History    Marital status:      Spouse name: Not on file    Number of children: 1    Years of education: Not on file    Highest education level: Bachelor's degree (e.g., BA, AB, BS)   Occupational History     Employer: [employer]   Tobacco Use    Smoking status: Former     Types: Cigars    Smokeless tobacco: Former     Types: Chew     Quit date: 1/3/2012    Tobacco comments:     formerly smoked a few cigars per year   Vaping Use    Vaping status: Never Used   Substance and Sexual Activity    Alcohol use: Not Currently     Alcohol/week: 3.6 oz     Comment: no Alcohol since July 2022    Drug use: Never    Sexual activity: Yes     Partners: Female     Birth control/protection: Surgical, Condom Male   Other Topics Concern    Not on file   Social History Narrative    Not on file     Social Drivers of  Health     Financial Resource Strain: Low Risk  (9/9/2024)    Overall Financial Resource Strain (CARDIA)     Difficulty of Paying Living Expenses: Not very hard   Food Insecurity: No Food Insecurity (9/9/2024)    Hunger Vital Sign     Worried About Running Out of Food in the Last Year: Never true     Ran Out of Food in the Last Year: Never true   Transportation Needs: No Transportation Needs (9/9/2024)    PRAPARE - Transportation     Lack of Transportation (Medical): No     Lack of Transportation (Non-Medical): No   Physical Activity: Unknown (9/9/2024)    Exercise Vital Sign     Days of Exercise per Week: Not on file     Minutes of Exercise per Session: 150+ min   Stress: No Stress Concern Present (9/9/2024)    [place] citizen [place] of Occupational Health - Occupational Stress Questionnaire     Feeling of Stress : Only a little   Social Connections: Moderately Isolated (9/9/2024)    Social Connection and Isolation Panel [NHANES]     Frequency of Communication with Friends and Family: More than three times a week     Frequency of Social Gatherings with Friends and Family: Once a week     Attends Muslim Services: 1 to 4 times per year     Active Member of Clubs or Organizations: No     Attends Club or Organization Meetings: Never     Marital Status:    Intimate Partner Violence: Not on file   Housing Stability: Low Risk  (9/9/2024)    Housing Stability Vital Sign     Unable to Pay for Housing in the Last Year: No     Number of Times Moved in the Last Year: 0     Homeless in the Last Year: No     Past Surgical History[1]  Past Medical History[2]  Current Medications[3]  Allergies[4]    Review of systems was conducted and was negative except for as explicitly listed in the History of Present Illness.       Objective   Lab/Radiology/Diagnostic Review:  PSA   Lab Results   Component Value Date/Time    PSATOTAL 2.92 02/05/2025 0616     MRI PROSTATE 4/2025  IMPRESSION:     1.  Questionable ill-defined 1.2 cm T2  hyperintensity with questionable restricted diffusion in the right transitional point near the apex.  2.  No pelvic lymphadenopathy or suspicious pelvic osseous lesions.     Total PI-RADS score: 3 - Intermediate (the presence of clinically significant cancer is equivocal)    I have reviewed and independently examined the lab and imaging results.  My findings are given in the HPI or above with the associated tests.        Assessment & Plan    It was a pleasure speaking with Sheng Joel today.    Sheng Joel is a 65 y.o. male w/ prostate cancer  - Discussed AUA and NCCN guidelines  - Discussed standard of care options for unfavorable intermediate risk prostate cancer  - Discussed radical prostatectomy as a surgical option  - Given his risk of extraprostatic disease, we estimated a low to moderate risk of requiring post-operative salvage radiation in the next 5 years  - Discussed the risks, benefits, alternatives to surgery  - Specifically we discussed the urinary and sexual function expectations  - All questions were answered.  The patient understands and agrees to proceed.    - In the context of his high Decipher result, we discussed radiation would likely require hormone therapy for at least 1 year, potentially 2 years  - Will see Dr. Glen weber/ radiation oncology this week       [1]   Past Surgical History:  Procedure Laterality Date    PROSTATE NEEDLE BIOPSY N/A 5/7/2025    Procedure: TRANSPERINEAL PROSTATE BIOPSY;  Surgeon: Merlin Garcia M.D.;  Location: SURGERY SAME DAY [facility];  Service: Urology    HERNIA REPAIR      VASECTOMY     [2]   Past Medical History:  Diagnosis Date    COVID-19 02/01/2021    Elevated hemoglobin A1c 01/05/2019    Gout     Hyperlipidemia     Hypertension     Obesity, morbid, BMI 40.0-49.9 (HCC)     Pneumonia     was hospitalized for this   [3]   Current Outpatient Medications   Medication Sig Dispense Refill    diphenhydrAMINE (BENADRYL) 25 MG Tab Take 25 mg by mouth  every 6 hours as needed for Sleep.      allopurinol (ZYLOPRIM) 100 MG Tab Take 1 Tablet by mouth every day. 90 Tablet 3    vitamin D2, Ergocalciferol, (DRISDOL) 1.25 MG (93935 UT) Cap capsule Take 1 Capsule by mouth every 7 days. 12 Capsule 3    INDOMETHACIN PO Take  by mouth as needed. Gout flare      Multiple Vitamins-Minerals (MULTI FOR HIM) Cap Take  by mouth.       No current facility-administered medications for this visit.   [4] No Known Allergies

## 2025-06-04 ENCOUNTER — HOSPITAL ENCOUNTER (OUTPATIENT)
Dept: RADIATION ONCOLOGY | Facility: MEDICAL CENTER | Age: 65
End: 2025-06-04
Attending: RADIOLOGY
Payer: COMMERCIAL

## 2025-06-04 VITALS
BODY MASS INDEX: 40.57 KG/M2 | TEMPERATURE: 97.2 F | WEIGHT: 259.04 LBS | RESPIRATION RATE: 18 BRPM | HEART RATE: 66 BPM | DIASTOLIC BLOOD PRESSURE: 106 MMHG | OXYGEN SATURATION: 97 % | SYSTOLIC BLOOD PRESSURE: 160 MMHG

## 2025-06-04 DIAGNOSIS — C61 PROSTATE CANCER (HCC): Primary | ICD-10-CM

## 2025-06-04 PROCEDURE — 99214 OFFICE O/P EST MOD 30 MIN: CPT | Performed by: RADIOLOGY

## 2025-06-04 PROCEDURE — 99205 OFFICE O/P NEW HI 60 MIN: CPT | Performed by: RADIOLOGY

## 2025-06-04 ASSESSMENT — ENCOUNTER SYMPTOMS
RESPIRATORY NEGATIVE: 1
CONSTIPATION: 0
DIARRHEA: 0
GASTROINTESTINAL NEGATIVE: 1
MUSCULOSKELETAL NEGATIVE: 1
CARDIOVASCULAR NEGATIVE: 1
NEUROLOGICAL NEGATIVE: 1
PSYCHIATRIC NEGATIVE: 1
CONSTITUTIONAL NEGATIVE: 1
ABDOMINAL PAIN: 0
EYES NEGATIVE: 1

## 2025-06-04 ASSESSMENT — FIBROSIS 4 INDEX: FIB4 SCORE: 1.23

## 2025-06-04 ASSESSMENT — PAIN SCALES - GENERAL: PAINLEVEL_OUTOF10: NO PAIN

## 2025-06-04 NOTE — PROGRESS NOTES
Patient was seen today in clinic with Dr. Carroll for consult.  Vitals signs and weight were obtained and pain assessment was completed.  Allergies and medications were reviewed with the patient.       Vitals/Pain:  Vitals:    06/04/25 0928   Pulse: 66   Resp: 18   Temp: 36.2 °C (97.2 °F)   TempSrc: Temporal   SpO2: 97%   Weight: 118 kg (259 lb 0.7 oz)            Allergies:   Patient has no known allergies.    Current Medications:  Current Medications[1]      PCP:  Antonia Thakur R.N.         [1]   Current Outpatient Medications   Medication Sig Dispense Refill    diphenhydrAMINE (BENADRYL) 25 MG Tab Take 25 mg by mouth every 6 hours as needed for Sleep.      allopurinol (ZYLOPRIM) 100 MG Tab Take 1 Tablet by mouth every day. 90 Tablet 3    vitamin D2, Ergocalciferol, (DRISDOL) 1.25 MG (21555 UT) Cap capsule Take 1 Capsule by mouth every 7 days. 12 Capsule 3    INDOMETHACIN PO Take  by mouth as needed. Gout flare      Multiple Vitamins-Minerals (MULTI FOR HIM) Cap Take  by mouth.       No current facility-administered medications for this encounter.      pain, throat

## 2025-06-04 NOTE — CONSULTS
RADIATION ONCOLOGY CONSULT  Patient name:  Sheng Jeol    Primary Physician:  LEONELA Souza MRN: [id number]  CSN: [id number]   Referring physician:  Merlin Garcia M.D.   : 1960, 65 y.o.     DATE OF SERVICE: 2025    IDENTIFICATION: A 65 y.o. male with  Visit Diagnoses     ICD-10-CM   1. Prostate cancer (HCC)  C61     Prostate cancer (HCC)  Staging form: Prostate, AJCC 8th Edition  - Clinical stage from 2025: Stage IIC (cT1c, cN0, cM0, PSA: 2.9, Grade Group: 3) - Signed by Steff Carroll M.D. on 2025  Histopathologic type: Adenocarcinoma, NOS  Stage prefix: Initial diagnosis  Prostate specific antigen (PSA) range: Less than 10  L[place]aster primary pattern: 4  Tala secondary pattern: 3  [place]aster score: 7  Histologic grading system: 5 grade system    He is here at the kind request of Merlin Mora M.D.     HISTORY OF PRESENT ILLNESS:   Subjective    This is a 65-year-old gentleman who I am seeing to discuss treatment options for his prostate cancer.  He has a strong family history, as detailed below.  Because of this, he was getting PSA screenings, and PSA was noted to be about 2.92 in February, stable from 2.1 a year ago.  However given the family history, he requested workup, and then had an MRI prostate that noted an ill-defined 1.2 cm lesion in the right transitional zone near the apex.  No adenopathy or bony disease.    He then had a prostate biopsy that identified 4 out of 7 cores positive with group 3 disease, as well as an additional atypical gland in the right anterior medial.  A decipher score was sent off and came back at 0.9.    He saw urology, and now comes to discuss radiation options.  Good urinary function.  Preserved erectile function.  Sexually active.  No bowel issues.  Leading towards surgery.        PAST MEDICAL HISTORY:   Past Medical History[1]    PAST SURGICAL HISTORY:  Past Surgical History[2]    CURRENT MEDICATIONS:  Current Medications[3]    ALLERGIES:     Patient has no known allergies.    FAMILY HISTORY:    Family History   Problem Relation Age of Onset    Breast Cancer Mother     Cancer Mother         Breast cancer    Heart Disease Father     Hypertension Father     Kidney Disease Father     Cancer Father         skin cancer    Skin cancer Father     Obesity Sister     Cancer Brother         Prostate Cancer    Prostate cancer Brother     Bladder cancer Brother     Cancer Maternal Uncle         Prostate cancer    Prostate cancer Maternal Uncle     Cancer Maternal Uncle         Lung Cancer    Lung Cancer Maternal Uncle     Cancer Paternal Aunt         Brain tumors    Cancer Maternal Grandmother         Colon cancer    Colon Cancer Maternal Grandmother     Heart Attack Maternal Grandfather 45    No Known Problems Paternal Grandmother     Stroke Paternal Grandfather     No Known Problems Son     Prostate cancer Cousin     Stroke Other            SOCIAL HISTORY:     reports that he has quit smoking. His smoking use included cigars. He quit smokeless tobacco use about 13 years ago.  His smokeless tobacco use included chew. He reports that he does not currently use alcohol after a past usage of about 3.6 oz of alcohol per week. He reports that he does not use drugs.     OCCUPATION: working as a  .     PAIN ASSESSMENT:      6/4/2025     9:28 AM   Pain Assessment   Pain Score NO PAIN       REVIEW OF SYSTEMS:  A review of systems taken.  Pertinence in HPI.  All others negative.  Review of Systems   Constitutional: Negative.    HENT: Negative.     Eyes: Negative.    Respiratory: Negative.     Cardiovascular: Negative.    Gastrointestinal: Negative.  Negative for abdominal pain, constipation and diarrhea.   Genitourinary:  Negative for dysuria, frequency and urgency.   Musculoskeletal: Negative.    Skin: Negative.    Neurological: Negative.    Endo/Heme/Allergies: Negative.    Psychiatric/Behavioral: Negative.           ECOG PERFORMANCE STATUS:      6/4/2025      9:37 AM   ECOG Performance Review   ECOG Performance Status Fully active, able to carry on all pre-disease performance without restriction       PHYSICAL EXAM:    Vitals:    06/04/25 0928   BP: (!) 160/106   BP Location: Left arm   Patient Position: Sitting   BP Cuff Size: Adult long   Pulse: 66   Resp: 18   Temp: 36.2 °C (97.2 °F)   TempSrc: Temporal   SpO2: 97%   Weight: 118 kg (259 lb 0.7 oz)            Physical Exam  Constitutional:       Appearance: Normal appearance. He is obese.   HENT:      Head: Normocephalic and atraumatic.   Eyes:      Extraocular Movements: Extraocular movements intact.      Conjunctiva/sclera: Conjunctivae normal.   Cardiovascular:      Rate and Rhythm: Normal rate and regular rhythm.   Pulmonary:      Effort: Pulmonary effort is normal. No respiratory distress.   Abdominal:      General: Abdomen is flat. There is no distension.      Palpations: Abdomen is soft.      Tenderness: There is no abdominal tenderness.   Musculoskeletal:         General: Normal range of motion.   Neurological:      General: No focal deficit present.      Mental Status: He is alert and oriented to person, place, and time.           INTERNATIONAL PROSTATE SYMPTOM SCORE (I-PSS):      6/4/2025     9:32 AM   I-PSS Review   Incomplete Emptying- How often have you had the sensation of not emptying your bladder? 0   Frequency- How often have you had to urinate less than every two hours? 1   Intermittency- How often have you found you stopped and started again several times when you urinated? 1   Urgency- How often have you found it difficult to postpone urination? 1   Weak Stream- How often have you had a weak urinary stream? 2   Straining- How often have you had to strain to start urination? 0   Nocturia- How many times did you typically get up at night to urinate? 3   Score: 8       QUALITY OF LIFE DUE TO URINARY SYMPTOMS:     If you were to spend the rest of your life with your urinary condition just the way it  is now, how would you feel about that? 1 = Pleased    SEXUAL HEALTH INVENTORY FOR MEN (OBINNA):       6/4/2025     9:33 AM   OBINNA Total   How do you rate your confidence that you could get and keep an erection? 5   When you had erections with sexual stimulation, how often were your erections hard enough for penetration (entering your partner)? 5   During sexual intercourse, how often were you able to maintain you erection after you had penetrated (entered) your partner? 5   During Sexual intercourse, how difficult was it to maintain your erection to completion of intercourse? 5   When you attampted sexual intercourse, how often was it satisfactory for you? 5   Total: 25       LABORATORY DATA:           Lab Results   Component Value Date/Time    PSATOTAL 2.92 02/05/2025 06:16 AM    PSATOTAL 2.13 02/16/2024 10:36 AM    PSATOTAL 0.84 04/19/2021 12:53 PM    PSATOTAL 0.33 12/10/2007 03:02 PM       RADIOLOGY DATA:  MRI prostate April 25 reviewed personally, small area of T2 hypointensity near the apex    IMPRESSION:    A 65 y.o. with   Visit Diagnoses     ICD-10-CM   1. Prostate cancer (HCC)  C61     Prostate cancer (HCC)  Staging form: Prostate, AJCC 8th Edition  - Clinical stage from 6/4/2025: Stage IIC (cT1c, cN0, cM0, PSA: 2.9, Grade Group: 3) - Signed by Steff Carroll M.D. on 6/4/2025  Histopathologic type: Adenocarcinoma, NOS  Stage prefix: Initial diagnosis  Prostate specific antigen (PSA) range: Less than 10  [place] primary pattern: 4  [place]ward secondary pattern: 3  Tala score: 7  Histologic grading system: 5 grade system        RECOMMENDATIONS:   I had a long conversation with Sheng today regarding his diagnosis of prostate cancer.  He has a very strong family history, and this requested earlier workup.  Despite a PSA of only 2.9, he was noted to have greater than 50% core positivity with group 3 disease.  At this point, he has unfavorable intermediate risk disease, though with a decipher score of 0.9, if he  opted for radiation, would be treated effectively as high risk disease with radiation therapy to the prostate and pelvic nodes along with 2 years of ADT.  His other option is radical prostatectomy and he has discussed this with surgery already.    Given his young age, good functional status, and his strong family history, I think he would be better served with a prostatectomy.  With radiation and ADT, he still has a reasonable risk of intra prostatic recurrence, which I think surgically salvaging or with brachytherapy salvage would be complicated for him.  Furthermore, he is sexually active and wants to preserve his erections as best as possible, and certainly with the 2 years of ADT, this would be a problem with radiation therapy.    He is amenable to surgery.  He will reach out to Dr. Garcia to schedule his surgical planning.  In the meantime, I will also send off genetic screening for him given his family history to assess for other future cancer risk.    Thank you for the opportunity to participate in his care.  If any questions or comments, please do not hesitate in calling.         [1]   Past Medical History:  Diagnosis Date    COVID-19 02/01/2021    Elevated hemoglobin A1c 01/05/2019    Gout     Hyperlipidemia     Hypertension     Obesity, morbid, BMI 40.0-49.9 (HCC)     Pneumonia     was hospitalized for this   [2]   Past Surgical History:  Procedure Laterality Date    PROSTATE NEEDLE BIOPSY N/A 5/7/2025    Procedure: TRANSPERINEAL PROSTATE BIOPSY;  Surgeon: Merlin Garcia M.D.;  Location: SURGERY SAME DAY [facility];  Service: Urology    HERNIA REPAIR      VASECTOMY     [3]   Current Outpatient Medications   Medication Sig Dispense Refill    diphenhydrAMINE (BENADRYL) 25 MG Tab Take 25 mg by mouth every 6 hours as needed for Sleep.      allopurinol (ZYLOPRIM) 100 MG Tab Take 1 Tablet by mouth every day. 90 Tablet 3    vitamin D2, Ergocalciferol, (DRISDOL) 1.25 MG (81481 UT) Cap capsule Take 1 Capsule by mouth every  7 days. 12 Capsule 3    INDOMETHACIN PO Take  by mouth as needed. Gout flare      Multiple Vitamins-Minerals (MULTI FOR HIM) Cap Take  by mouth.       No current facility-administered medications for this encounter.

## 2025-06-05 ENCOUNTER — PATIENT OUTREACH (OUTPATIENT)
Dept: ONCOLOGY | Facility: MEDICAL CENTER | Age: 65
End: 2025-06-05
Payer: COMMERCIAL

## 2025-06-05 NOTE — PROGRESS NOTES
Sheng able to call ONTERRIE back. Sheng is at work today. GENIE will notify Dr. Garcia and his team of Sheng's decision and readiness to schedule prostatectomy. Sheng voiced that he will have his son come down from [place] and stay with him after surgery. Ideally a surgical date between 7/14-7/31 would be best for Sheng and his son to plan.

## 2025-06-05 NOTE — PROGRESS NOTES
First call placed to Sheng to introduce self and assess. No answer at time of call, LVM with reason for call and call back number. Sheng has had treatment discussions this week with Dr. Garcia and Dr. Carroll regarding prostate cancer. Sheng has decided to pursue prostatectomy with Dr. Garcia.

## 2025-06-05 NOTE — LETTER
Rajesh ESTRELLA [facility]    [address] [place] [address]  [place] [address]  Phone: [phone] - Fax: [phone]              Address:  [address] [address] [address]  [place] [address]     Date: 06/05/25  Medical Record Number: [id number]    Dear Sheng Joel,    I am a Cancer Nurse Navigator, a certified oncology nurse. My role is to assess any needs you may have with education, guidance and support. I am available to you and your family through your treatment at [facility].       I am available to address your needs during your journey with the following services:     Care Coordination  I can assist you in facilitating communication between your cancer care treatment team to ensure timely treatment and follow-up.  I can also assist with transition of care back to your primary care provider, or other specialist, as needed.  My goal is to bridge gaps for you throughout the course of your active treatment.       Education Services  Understanding the recommended treatment course by your physician is key. I can provide educational resources personalized to your cancer diagnosis to help you understand your diagnosis and treatment. Please let me know if you would like to receive information about your diagnosis and treatment plan.  I am here to help.     Support Services/Resource Information  [facility] we offer a full scope of support services.  I can assist you with referral information to:  Cancer Clinical Trials & Research  Nutrition counseling  Support groups  Complementary Therapies such as Mind-Body Techniques Meditation  Patient Financial Advocates    [facility], an [employer] affiliate office, our volunteers can assist you with accessing our TRX Systemsing library, support services information, head coverings and comfort items  Community and national resources, included eligibility based mark assistance and pharmaceutical access programs, if you are in need of  additional information.     [facility] [employer] offers services that include:  Behavioral Health  Genetic counseling & testing  Acupuncture  Lymphedema prevention/treatment program  Palliative care services.        I hope you have an excellent patient experience.  Please feel free to share with me your comments regarding the care you have received- we value your feedback.    Sincerely,     Melba Smith R.N.  Cancer Nurse Navigator    Office: [phone]   Email:

## 2025-06-06 ENCOUNTER — TELEPHONE (OUTPATIENT)
Dept: UROLOGY | Facility: MEDICAL CENTER | Age: 65
End: 2025-06-06
Payer: COMMERCIAL

## 2025-06-12 ENCOUNTER — PATIENT MESSAGE (OUTPATIENT)
Dept: ONCOLOGY | Facility: MEDICAL CENTER | Age: 65
End: 2025-06-12
Payer: COMMERCIAL

## 2025-06-17 ENCOUNTER — APPOINTMENT (OUTPATIENT)
Dept: ADMISSIONS | Facility: MEDICAL CENTER | Age: 65
End: 2025-06-17
Attending: UROLOGY
Payer: COMMERCIAL

## 2025-06-23 ENCOUNTER — PATIENT OUTREACH (OUTPATIENT)
Dept: ONCOLOGY | Facility: MEDICAL CENTER | Age: 65
End: 2025-06-23
Payer: COMMERCIAL

## 2025-06-24 ENCOUNTER — PRE-ADMISSION TESTING (OUTPATIENT)
Dept: ADMISSIONS | Facility: MEDICAL CENTER | Age: 65
End: 2025-06-24
Attending: UROLOGY
Payer: COMMERCIAL

## 2025-06-24 NOTE — PROGRESS NOTES
"On June 24, 2025, Oncology Social Worker Lanette Carens contacted pt. via telephone to follow up on psychosocial distress screening.  ROXANA Carnes introduced herself, role and reason for call.  Pt. shared he was currently at the gym on the treadmill.  ROXANA Carnes asked pt. if he prefer for her to call another time.  Pt. stated timing was okay.  Pt. shared he does not know \"how much this is all going to cost me.\"  Pt. shared \"so far everything is fine.\"  Pt. shared he is going to be dropping some paperwork from HR at his work to Dr. Garcia's office.  Pt. denies additional barriers to care and/or stressors.  ROXANA Carnes encouraged pt. to reach out to her if he needs additional support.  Pt. agreed and thanked ROXANA Carnes for calling.    "

## 2025-07-03 ENCOUNTER — PRE-ADMISSION TESTING (OUTPATIENT)
Dept: ADMISSIONS | Facility: MEDICAL CENTER | Age: 65
End: 2025-07-03
Attending: UROLOGY
Payer: COMMERCIAL

## 2025-07-03 DIAGNOSIS — Z01.812 PRE-OPERATIVE LABORATORY EXAMINATION: Primary | ICD-10-CM

## 2025-07-03 LAB
ABO GROUP BLD: NORMAL
ANION GAP SERPL CALC-SCNC: 12 MMOL/L (ref 7–16)
APTT PPP: 27.6 SEC (ref 24.7–36)
BASOPHILS # BLD AUTO: 0.7 % (ref 0–1.8)
BASOPHILS # BLD: 0.05 K/UL (ref 0–0.12)
BLD GP AB SCN SERPL QL: NORMAL
BUN SERPL-MCNC: 19 MG/DL (ref 8–22)
CALCIUM SERPL-MCNC: 8.5 MG/DL (ref 8.5–10.5)
CHLORIDE SERPL-SCNC: 108 MMOL/L (ref 96–112)
CO2 SERPL-SCNC: 22 MMOL/L (ref 20–33)
CREAT SERPL-MCNC: 1.24 MG/DL (ref 0.5–1.4)
EOSINOPHIL # BLD AUTO: 0.14 K/UL (ref 0–0.51)
EOSINOPHIL NFR BLD: 2 % (ref 0–6.9)
ERYTHROCYTE [DISTWIDTH] IN BLOOD BY AUTOMATED COUNT: 44.4 FL (ref 35.9–50)
GFR SERPLBLD CREATININE-BSD FMLA CKD-EPI: 65 ML/MIN/1.73 M 2
GLUCOSE SERPL-MCNC: 93 MG/DL (ref 65–99)
HCT VFR BLD AUTO: 41.7 % (ref 42–52)
HGB BLD-MCNC: 14 G/DL (ref 14–18)
IMM GRANULOCYTES # BLD AUTO: 0.01 K/UL (ref 0–0.11)
IMM GRANULOCYTES NFR BLD AUTO: 0.1 % (ref 0–0.9)
INR PPP: 1.04 (ref 0.87–1.13)
LYMPHOCYTES # BLD AUTO: 2.97 K/UL (ref 1–4.8)
LYMPHOCYTES NFR BLD: 42.3 % (ref 22–41)
MCH RBC QN AUTO: 29.1 PG (ref 27–33)
MCHC RBC AUTO-ENTMCNC: 33.6 G/DL (ref 32.3–36.5)
MCV RBC AUTO: 86.7 FL (ref 81.4–97.8)
MONOCYTES # BLD AUTO: 0.63 K/UL (ref 0–0.85)
MONOCYTES NFR BLD AUTO: 9 % (ref 0–13.4)
NEUTROPHILS # BLD AUTO: 3.22 K/UL (ref 1.82–7.42)
NEUTROPHILS NFR BLD: 45.9 % (ref 44–72)
NRBC # BLD AUTO: 0 K/UL
NRBC BLD-RTO: 0 /100 WBC (ref 0–0.2)
PLATELET # BLD AUTO: 266 K/UL (ref 164–446)
PMV BLD AUTO: 9.5 FL (ref 9–12.9)
POTASSIUM SERPL-SCNC: 4.3 MMOL/L (ref 3.6–5.5)
PROTHROMBIN TIME: 13.6 SEC (ref 12–14.6)
RBC # BLD AUTO: 4.81 M/UL (ref 4.7–6.1)
RH BLD: NORMAL
SODIUM SERPL-SCNC: 142 MMOL/L (ref 135–145)
WBC # BLD AUTO: 7 K/UL (ref 4.8–10.8)

## 2025-07-03 PROCEDURE — 86900 BLOOD TYPING SEROLOGIC ABO: CPT

## 2025-07-03 PROCEDURE — 36415 COLL VENOUS BLD VENIPUNCTURE: CPT

## 2025-07-03 PROCEDURE — 86901 BLOOD TYPING SEROLOGIC RH(D): CPT

## 2025-07-03 PROCEDURE — 80048 BASIC METABOLIC PNL TOTAL CA: CPT

## 2025-07-03 PROCEDURE — 85025 COMPLETE CBC W/AUTO DIFF WBC: CPT

## 2025-07-03 PROCEDURE — 85730 THROMBOPLASTIN TIME PARTIAL: CPT

## 2025-07-03 PROCEDURE — 86850 RBC ANTIBODY SCREEN: CPT

## 2025-07-03 PROCEDURE — 85610 PROTHROMBIN TIME: CPT

## 2025-07-13 DIAGNOSIS — E55.9 VITAMIN D DEFICIENCY: ICD-10-CM

## 2025-07-13 DIAGNOSIS — M1A.09X0 IDIOPATHIC CHRONIC GOUT OF MULTIPLE SITES WITHOUT TOPHUS: ICD-10-CM

## 2025-07-14 RX ORDER — ERGOCALCIFEROL 1.25 MG/1
50000 CAPSULE, LIQUID FILLED ORAL
Qty: 12 CAPSULE | Refills: 1 | Status: SHIPPED | OUTPATIENT
Start: 2025-07-14

## 2025-07-14 RX ORDER — ALLOPURINOL 100 MG/1
100 TABLET ORAL DAILY
Qty: 90 TABLET | Refills: 1 | Status: SHIPPED | OUTPATIENT
Start: 2025-07-14

## 2025-07-14 NOTE — TELEPHONE ENCOUNTER
Received request via: Pharmacy    Was the patient seen in the last year in this department? Yes    Does the patient have an active prescription (recently filled or refills available) for medication(s) requested? No    Pharmacy Name: [facility]    Does the patient have skilled nursing Plus and need 100-day supply? (This applies to ALL medications) Patient does not have SCP

## 2025-07-14 NOTE — TELEPHONE ENCOUNTER
Requested Prescriptions     Pending Prescriptions Disp Refills    allopurinol (ZYLOPRIM) 100 MG Tab [Pharmacy Med Name: Allopurinol 100 MG Oral Tablet] 90 Tablet 1     Sig: Take 1 tablet by mouth once daily    vitamin D2 (ERGOCALCIFEROL) 1.25 MG (67013 UT) Cap capsule [Pharmacy Med Name: Vitamin D (Ergocalciferol) 1.25 MG (86347 UT) Oral Capsule] 12 Capsule 1     Sig: Take 1 capsule by mouth once a week       ARAVIND Souza.

## 2025-07-15 ENCOUNTER — ANESTHESIA EVENT (OUTPATIENT)
Dept: SURGERY | Facility: MEDICAL CENTER | Age: 65
End: 2025-07-15
Payer: COMMERCIAL

## 2025-07-15 ENCOUNTER — TELEPHONE (OUTPATIENT)
Dept: UROLOGY | Facility: MEDICAL CENTER | Age: 65
End: 2025-07-15
Payer: COMMERCIAL

## 2025-07-16 ENCOUNTER — HOSPITAL ENCOUNTER (OUTPATIENT)
Facility: MEDICAL CENTER | Age: 65
End: 2025-07-16
Attending: UROLOGY | Admitting: UROLOGY
Payer: COMMERCIAL

## 2025-07-16 ENCOUNTER — ANESTHESIA (OUTPATIENT)
Dept: SURGERY | Facility: MEDICAL CENTER | Age: 65
End: 2025-07-16
Payer: COMMERCIAL

## 2025-07-16 VITALS
BODY MASS INDEX: 40.31 KG/M2 | OXYGEN SATURATION: 92 % | SYSTOLIC BLOOD PRESSURE: 162 MMHG | RESPIRATION RATE: 14 BRPM | WEIGHT: 256.84 LBS | HEART RATE: 69 BPM | DIASTOLIC BLOOD PRESSURE: 90 MMHG | TEMPERATURE: 97.7 F | HEIGHT: 67 IN

## 2025-07-16 DIAGNOSIS — C61 PROSTATE CANCER (HCC): Primary | ICD-10-CM

## 2025-07-16 LAB
ABO + RH BLD: NORMAL
PATHOLOGY CONSULT NOTE: NORMAL

## 2025-07-16 PROCEDURE — 700101 HCHG RX REV CODE 250: Performed by: ANESTHESIOLOGY

## 2025-07-16 PROCEDURE — 700102 HCHG RX REV CODE 250 W/ 637 OVERRIDE(OP): Performed by: ANESTHESIOLOGY

## 2025-07-16 PROCEDURE — 700105 HCHG RX REV CODE 258: Performed by: ANESTHESIOLOGY

## 2025-07-16 PROCEDURE — 88344 IMHCHEM/IMCYTCHM EA MLT ANTB: CPT | Performed by: PATHOLOGY

## 2025-07-16 PROCEDURE — 88344 IMHCHEM/IMCYTCHM EA MLT ANTB: CPT | Mod: 26 | Performed by: PATHOLOGY

## 2025-07-16 PROCEDURE — G0378 HOSPITAL OBSERVATION PER HR: HCPCS

## 2025-07-16 PROCEDURE — 700101 HCHG RX REV CODE 250: Performed by: UROLOGY

## 2025-07-16 PROCEDURE — 160031 HCHG SURGERY MINUTES - 1ST 30 MINS LEVEL 5: Performed by: UROLOGY

## 2025-07-16 PROCEDURE — 88309 TISSUE EXAM BY PATHOLOGIST: CPT | Mod: 26 | Performed by: PATHOLOGY

## 2025-07-16 PROCEDURE — 700105 HCHG RX REV CODE 258: Performed by: UROLOGY

## 2025-07-16 PROCEDURE — 160048 HCHG OR STATISTICAL LEVEL 1-5: Performed by: UROLOGY

## 2025-07-16 PROCEDURE — 160002 HCHG RECOVERY MINUTES (STAT): Performed by: UROLOGY

## 2025-07-16 PROCEDURE — 88342 IMHCHEM/IMCYTCHM 1ST ANTB: CPT | Mod: 26,59 | Performed by: PATHOLOGY

## 2025-07-16 PROCEDURE — 36415 COLL VENOUS BLD VENIPUNCTURE: CPT

## 2025-07-16 PROCEDURE — 88309 TISSUE EXAM BY PATHOLOGIST: CPT | Performed by: PATHOLOGY

## 2025-07-16 PROCEDURE — 160192 HCHG ANESTHESIA COMPLEX: Performed by: UROLOGY

## 2025-07-16 PROCEDURE — 88342 IMHCHEM/IMCYTCHM 1ST ANTB: CPT | Performed by: PATHOLOGY

## 2025-07-16 PROCEDURE — 700111 HCHG RX REV CODE 636 W/ 250 OVERRIDE (IP): Mod: JZ | Performed by: UROLOGY

## 2025-07-16 PROCEDURE — 160015 HCHG STAT PREOP MINUTES: Performed by: UROLOGY

## 2025-07-16 PROCEDURE — 110371 HCHG SHELL REV 272: Performed by: UROLOGY

## 2025-07-16 PROCEDURE — 96374 THER/PROPH/DIAG INJ IV PUSH: CPT

## 2025-07-16 PROCEDURE — 55866 LAPS SURG PRST8ECT RPBIC RAD: CPT | Performed by: UROLOGY

## 2025-07-16 PROCEDURE — 86901 BLOOD TYPING SEROLOGIC RH(D): CPT

## 2025-07-16 PROCEDURE — 160193 HCHG PACU STANDARD - 1ST 60 MINS: Performed by: UROLOGY

## 2025-07-16 PROCEDURE — A9270 NON-COVERED ITEM OR SERVICE: HCPCS | Performed by: ANESTHESIOLOGY

## 2025-07-16 PROCEDURE — 502714 HCHG ROBOTIC SURGERY SERVICES: Performed by: UROLOGY

## 2025-07-16 PROCEDURE — 700111 HCHG RX REV CODE 636 W/ 250 OVERRIDE (IP): Performed by: ANESTHESIOLOGY

## 2025-07-16 PROCEDURE — 86900 BLOOD TYPING SEROLOGIC ABO: CPT

## 2025-07-16 PROCEDURE — 160042 HCHG SURGERY MINUTES - EA ADDL 1 MIN LEVEL 5: Performed by: UROLOGY

## 2025-07-16 RX ORDER — LIDOCAINE HYDROCHLORIDE 20 MG/ML
INJECTION, SOLUTION EPIDURAL; INFILTRATION; INTRACAUDAL; PERINEURAL PRN
Status: DISCONTINUED | OUTPATIENT
Start: 2025-07-16 | End: 2025-07-16 | Stop reason: SURG

## 2025-07-16 RX ORDER — POLYETHYLENE GLYCOL 3350 17 G/17G
1 POWDER, FOR SOLUTION ORAL DAILY
Status: DISCONTINUED | OUTPATIENT
Start: 2025-07-16 | End: 2025-07-16 | Stop reason: HOSPADM

## 2025-07-16 RX ORDER — HALOPERIDOL 5 MG/ML
1 INJECTION INTRAMUSCULAR
Status: DISCONTINUED | OUTPATIENT
Start: 2025-07-16 | End: 2025-07-16 | Stop reason: HOSPADM

## 2025-07-16 RX ORDER — HALOPERIDOL 5 MG/ML
1 INJECTION INTRAMUSCULAR EVERY 6 HOURS PRN
Status: DISCONTINUED | OUTPATIENT
Start: 2025-07-16 | End: 2025-07-16 | Stop reason: HOSPADM

## 2025-07-16 RX ORDER — OXYCODONE HYDROCHLORIDE 5 MG/1
5 TABLET ORAL EVERY 6 HOURS PRN
Status: DISCONTINUED | OUTPATIENT
Start: 2025-07-16 | End: 2025-07-16 | Stop reason: HOSPADM

## 2025-07-16 RX ORDER — KETOROLAC TROMETHAMINE 15 MG/ML
15 INJECTION, SOLUTION INTRAMUSCULAR; INTRAVENOUS EVERY 6 HOURS
Status: DISCONTINUED | OUTPATIENT
Start: 2025-07-16 | End: 2025-07-16 | Stop reason: HOSPADM

## 2025-07-16 RX ORDER — KETOROLAC TROMETHAMINE 15 MG/ML
INJECTION, SOLUTION INTRAMUSCULAR; INTRAVENOUS PRN
Status: DISCONTINUED | OUTPATIENT
Start: 2025-07-16 | End: 2025-07-16 | Stop reason: SURG

## 2025-07-16 RX ORDER — OXYCODONE HCL 5 MG/5 ML
10 SOLUTION, ORAL ORAL
Status: DISCONTINUED | OUTPATIENT
Start: 2025-07-16 | End: 2025-07-16 | Stop reason: HOSPADM

## 2025-07-16 RX ORDER — ROCURONIUM BROMIDE 10 MG/ML
INJECTION, SOLUTION INTRAVENOUS PRN
Status: DISCONTINUED | OUTPATIENT
Start: 2025-07-16 | End: 2025-07-16 | Stop reason: SURG

## 2025-07-16 RX ORDER — INDOMETHACIN 25 MG/1
25 CAPSULE ORAL
Status: DISCONTINUED | OUTPATIENT
Start: 2025-07-19 | End: 2025-07-16 | Stop reason: HOSPADM

## 2025-07-16 RX ORDER — ENOXAPARIN SODIUM 100 MG/ML
40 INJECTION SUBCUTANEOUS DAILY
Status: DISCONTINUED | OUTPATIENT
Start: 2025-07-17 | End: 2025-07-16 | Stop reason: HOSPADM

## 2025-07-16 RX ORDER — DIPHENHYDRAMINE HCL 25 MG
25 TABLET ORAL EVERY 6 HOURS PRN
Status: DISCONTINUED | OUTPATIENT
Start: 2025-07-16 | End: 2025-07-16 | Stop reason: HOSPADM

## 2025-07-16 RX ORDER — ONDANSETRON 2 MG/ML
INJECTION INTRAMUSCULAR; INTRAVENOUS PRN
Status: DISCONTINUED | OUTPATIENT
Start: 2025-07-16 | End: 2025-07-16 | Stop reason: SURG

## 2025-07-16 RX ORDER — ONDANSETRON 2 MG/ML
4 INJECTION INTRAMUSCULAR; INTRAVENOUS
Status: DISCONTINUED | OUTPATIENT
Start: 2025-07-16 | End: 2025-07-16 | Stop reason: HOSPADM

## 2025-07-16 RX ORDER — DIPHENHYDRAMINE HYDROCHLORIDE 50 MG/ML
25 INJECTION, SOLUTION INTRAMUSCULAR; INTRAVENOUS EVERY 6 HOURS PRN
Status: DISCONTINUED | OUTPATIENT
Start: 2025-07-16 | End: 2025-07-16 | Stop reason: HOSPADM

## 2025-07-16 RX ORDER — EPHEDRINE SULFATE 50 MG/ML
INJECTION, SOLUTION INTRAVENOUS PRN
Status: DISCONTINUED | OUTPATIENT
Start: 2025-07-16 | End: 2025-07-16 | Stop reason: SURG

## 2025-07-16 RX ORDER — DIPHENHYDRAMINE HYDROCHLORIDE 50 MG/ML
12.5 INJECTION, SOLUTION INTRAMUSCULAR; INTRAVENOUS
Status: DISCONTINUED | OUTPATIENT
Start: 2025-07-16 | End: 2025-07-16 | Stop reason: HOSPADM

## 2025-07-16 RX ORDER — SODIUM CHLORIDE, SODIUM LACTATE, POTASSIUM CHLORIDE, CALCIUM CHLORIDE 600; 310; 30; 20 MG/100ML; MG/100ML; MG/100ML; MG/100ML
INJECTION, SOLUTION INTRAVENOUS
Status: DISCONTINUED | OUTPATIENT
Start: 2025-07-16 | End: 2025-07-16 | Stop reason: SURG

## 2025-07-16 RX ORDER — ACETAMINOPHEN 500 MG
1000 TABLET ORAL ONCE
Status: COMPLETED | OUTPATIENT
Start: 2025-07-16 | End: 2025-07-16

## 2025-07-16 RX ORDER — SCOPOLAMINE 1 MG/3D
1 PATCH, EXTENDED RELEASE TRANSDERMAL
Status: DISCONTINUED | OUTPATIENT
Start: 2025-07-16 | End: 2025-07-16 | Stop reason: HOSPADM

## 2025-07-16 RX ORDER — LIDOCAINE HYDROCHLORIDE 40 MG/ML
SOLUTION TOPICAL PRN
Status: DISCONTINUED | OUTPATIENT
Start: 2025-07-16 | End: 2025-07-16 | Stop reason: SURG

## 2025-07-16 RX ORDER — CEFAZOLIN SODIUM 1 G/3ML
INJECTION, POWDER, FOR SOLUTION INTRAMUSCULAR; INTRAVENOUS PRN
Status: DISCONTINUED | OUTPATIENT
Start: 2025-07-16 | End: 2025-07-16 | Stop reason: SURG

## 2025-07-16 RX ORDER — HYDROMORPHONE HYDROCHLORIDE 1 MG/ML
0.25 INJECTION, SOLUTION INTRAMUSCULAR; INTRAVENOUS; SUBCUTANEOUS EVERY 6 HOURS PRN
Status: DISCONTINUED | OUTPATIENT
Start: 2025-07-16 | End: 2025-07-16 | Stop reason: HOSPADM

## 2025-07-16 RX ORDER — ACETAMINOPHEN 500 MG
1000 TABLET ORAL ONCE
Status: DISCONTINUED | OUTPATIENT
Start: 2025-07-16 | End: 2025-07-16

## 2025-07-16 RX ORDER — MEPERIDINE HYDROCHLORIDE 50 MG/ML
6.25 INJECTION INTRAMUSCULAR; INTRAVENOUS; SUBCUTANEOUS
Status: DISCONTINUED | OUTPATIENT
Start: 2025-07-16 | End: 2025-07-16 | Stop reason: HOSPADM

## 2025-07-16 RX ORDER — DEXAMETHASONE SODIUM PHOSPHATE 4 MG/ML
INJECTION, SOLUTION INTRA-ARTICULAR; INTRALESIONAL; INTRAMUSCULAR; INTRAVENOUS; SOFT TISSUE PRN
Status: DISCONTINUED | OUTPATIENT
Start: 2025-07-16 | End: 2025-07-16 | Stop reason: SURG

## 2025-07-16 RX ORDER — ACETAMINOPHEN 500 MG
1000 TABLET ORAL EVERY 6 HOURS PRN
Status: DISCONTINUED | OUTPATIENT
Start: 2025-07-21 | End: 2025-07-16 | Stop reason: HOSPADM

## 2025-07-16 RX ORDER — SODIUM CHLORIDE, SODIUM LACTATE, POTASSIUM CHLORIDE, CALCIUM CHLORIDE 600; 310; 30; 20 MG/100ML; MG/100ML; MG/100ML; MG/100ML
INJECTION, SOLUTION INTRAVENOUS CONTINUOUS
Status: DISCONTINUED | OUTPATIENT
Start: 2025-07-16 | End: 2025-07-16 | Stop reason: HOSPADM

## 2025-07-16 RX ORDER — IBUPROFEN 800 MG/1
800 TABLET, FILM COATED ORAL 3 TIMES DAILY PRN
Status: DISCONTINUED | OUTPATIENT
Start: 2025-07-19 | End: 2025-07-16

## 2025-07-16 RX ORDER — OXYCODONE HYDROCHLORIDE 5 MG/1
2.5 TABLET ORAL EVERY 6 HOURS PRN
Status: DISCONTINUED | OUTPATIENT
Start: 2025-07-16 | End: 2025-07-16 | Stop reason: HOSPADM

## 2025-07-16 RX ORDER — AMOXICILLIN 250 MG
1 CAPSULE ORAL DAILY
Qty: 15 TABLET | Refills: 0 | Status: SHIPPED | OUTPATIENT
Start: 2025-07-16

## 2025-07-16 RX ORDER — HYDRALAZINE HYDROCHLORIDE 20 MG/ML
5 INJECTION INTRAMUSCULAR; INTRAVENOUS
Status: DISCONTINUED | OUTPATIENT
Start: 2025-07-16 | End: 2025-07-16 | Stop reason: HOSPADM

## 2025-07-16 RX ORDER — OXYCODONE HCL 5 MG/5 ML
5 SOLUTION, ORAL ORAL
Status: DISCONTINUED | OUTPATIENT
Start: 2025-07-16 | End: 2025-07-16 | Stop reason: HOSPADM

## 2025-07-16 RX ORDER — ACETAMINOPHEN 500 MG
1000 TABLET ORAL EVERY 6 HOURS
Status: DISCONTINUED | OUTPATIENT
Start: 2025-07-16 | End: 2025-07-16 | Stop reason: HOSPADM

## 2025-07-16 RX ORDER — ONDANSETRON 2 MG/ML
4 INJECTION INTRAMUSCULAR; INTRAVENOUS EVERY 4 HOURS PRN
Status: DISCONTINUED | OUTPATIENT
Start: 2025-07-16 | End: 2025-07-16 | Stop reason: HOSPADM

## 2025-07-16 RX ORDER — DEXMEDETOMIDINE HYDROCHLORIDE 100 UG/ML
INJECTION, SOLUTION INTRAVENOUS PRN
Status: DISCONTINUED | OUTPATIENT
Start: 2025-07-16 | End: 2025-07-16 | Stop reason: SURG

## 2025-07-16 RX ORDER — ALLOPURINOL 100 MG/1
100 TABLET ORAL DAILY
Status: DISCONTINUED | OUTPATIENT
Start: 2025-07-16 | End: 2025-07-16 | Stop reason: HOSPADM

## 2025-07-16 RX ORDER — OXYCODONE HYDROCHLORIDE 5 MG/1
5 TABLET ORAL EVERY 6 HOURS PRN
Qty: 10 TABLET | Refills: 0 | Status: SHIPPED | OUTPATIENT
Start: 2025-07-16 | End: 2025-07-21

## 2025-07-16 RX ORDER — MIDAZOLAM HYDROCHLORIDE 1 MG/ML
INJECTION INTRAMUSCULAR; INTRAVENOUS PRN
Status: DISCONTINUED | OUTPATIENT
Start: 2025-07-16 | End: 2025-07-16 | Stop reason: SURG

## 2025-07-16 RX ORDER — HYDROMORPHONE HYDROCHLORIDE 2 MG/ML
INJECTION, SOLUTION INTRAMUSCULAR; INTRAVENOUS; SUBCUTANEOUS PRN
Status: DISCONTINUED | OUTPATIENT
Start: 2025-07-16 | End: 2025-07-16 | Stop reason: SURG

## 2025-07-16 RX ORDER — DEXAMETHASONE SODIUM PHOSPHATE 4 MG/ML
4 INJECTION, SOLUTION INTRA-ARTICULAR; INTRALESIONAL; INTRAMUSCULAR; INTRAVENOUS; SOFT TISSUE
Status: DISCONTINUED | OUTPATIENT
Start: 2025-07-16 | End: 2025-07-16 | Stop reason: HOSPADM

## 2025-07-16 RX ORDER — LABETALOL HYDROCHLORIDE 5 MG/ML
20 INJECTION, SOLUTION INTRAVENOUS
Status: DISCONTINUED | OUTPATIENT
Start: 2025-07-16 | End: 2025-07-16 | Stop reason: HOSPADM

## 2025-07-16 RX ADMIN — FENTANYL CITRATE 50 MCG: 50 INJECTION, SOLUTION INTRAMUSCULAR; INTRAVENOUS at 07:52

## 2025-07-16 RX ADMIN — FENTANYL CITRATE 50 MCG: 50 INJECTION, SOLUTION INTRAMUSCULAR; INTRAVENOUS at 10:25

## 2025-07-16 RX ADMIN — ROCURONIUM BROMIDE 20 MG: 10 INJECTION INTRAVENOUS at 09:40

## 2025-07-16 RX ADMIN — ROCURONIUM BROMIDE 70 MG: 10 INJECTION INTRAVENOUS at 07:35

## 2025-07-16 RX ADMIN — ACETAMINOPHEN 1000 MG: 500 TABLET ORAL at 10:58

## 2025-07-16 RX ADMIN — ROCURONIUM BROMIDE 10 MG: 10 INJECTION INTRAVENOUS at 09:56

## 2025-07-16 RX ADMIN — FENTANYL CITRATE 25 MCG: 50 INJECTION, SOLUTION INTRAMUSCULAR; INTRAVENOUS at 08:48

## 2025-07-16 RX ADMIN — EPHEDRINE SULFATE 10 MG: 50 INJECTION, SOLUTION INTRAVENOUS at 08:25

## 2025-07-16 RX ADMIN — HYDROMORPHONE HYDROCHLORIDE 0.5 MG: 2 INJECTION INTRAMUSCULAR; INTRAVENOUS; SUBCUTANEOUS at 10:32

## 2025-07-16 RX ADMIN — ROCURONIUM BROMIDE 20 MG: 10 INJECTION INTRAVENOUS at 10:10

## 2025-07-16 RX ADMIN — ROCURONIUM BROMIDE 30 MG: 10 INJECTION INTRAVENOUS at 08:47

## 2025-07-16 RX ADMIN — PROPOFOL 30 MG: 10 INJECTION, EMULSION INTRAVENOUS at 07:52

## 2025-07-16 RX ADMIN — ROCURONIUM BROMIDE 20 MG: 10 INJECTION INTRAVENOUS at 09:09

## 2025-07-16 RX ADMIN — SODIUM CHLORIDE, POTASSIUM CHLORIDE, SODIUM LACTATE AND CALCIUM CHLORIDE: 600; 310; 30; 20 INJECTION, SOLUTION INTRAVENOUS at 16:14

## 2025-07-16 RX ADMIN — ONDANSETRON 4 MG: 2 INJECTION INTRAMUSCULAR; INTRAVENOUS at 08:10

## 2025-07-16 RX ADMIN — LIDOCAINE HYDROCHLORIDE 60 MG: 20 INJECTION, SOLUTION EPIDURAL; INFILTRATION; INTRACAUDAL; PERINEURAL at 07:35

## 2025-07-16 RX ADMIN — FENTANYL CITRATE 25 MCG: 50 INJECTION, SOLUTION INTRAMUSCULAR; INTRAVENOUS at 08:36

## 2025-07-16 RX ADMIN — ROCURONIUM BROMIDE 30 MG: 10 INJECTION INTRAVENOUS at 08:11

## 2025-07-16 RX ADMIN — PROPOFOL 170 MG: 10 INJECTION, EMULSION INTRAVENOUS at 07:35

## 2025-07-16 RX ADMIN — SUGAMMADEX 100 MG: 100 INJECTION, SOLUTION INTRAVENOUS at 10:42

## 2025-07-16 RX ADMIN — SUGAMMADEX 200 MG: 100 INJECTION, SOLUTION INTRAVENOUS at 10:27

## 2025-07-16 RX ADMIN — DEXMEDETOMIDINE 20 MCG: 100 INJECTION, SOLUTION INTRAVENOUS at 10:42

## 2025-07-16 RX ADMIN — LIDOCAINE HYDROCHLORIDE 4 ML: 40 SOLUTION TOPICAL at 07:43

## 2025-07-16 RX ADMIN — FENTANYL CITRATE 50 MCG: 50 INJECTION, SOLUTION INTRAMUSCULAR; INTRAVENOUS at 07:35

## 2025-07-16 RX ADMIN — DEXAMETHASONE SODIUM PHOSPHATE 8 MG: 4 INJECTION INTRA-ARTICULAR; INTRALESIONAL; INTRAMUSCULAR; INTRAVENOUS; SOFT TISSUE at 08:10

## 2025-07-16 RX ADMIN — SODIUM CHLORIDE, POTASSIUM CHLORIDE, SODIUM LACTATE AND CALCIUM CHLORIDE: 600; 310; 30; 20 INJECTION, SOLUTION INTRAVENOUS at 07:30

## 2025-07-16 RX ADMIN — CEFAZOLIN 2 G: 1 INJECTION, POWDER, FOR SOLUTION INTRAMUSCULAR; INTRAVENOUS at 07:35

## 2025-07-16 RX ADMIN — EPHEDRINE SULFATE 10 MG: 50 INJECTION, SOLUTION INTRAVENOUS at 08:02

## 2025-07-16 RX ADMIN — KETOROLAC TROMETHAMINE 15 MG: 15 INJECTION, SOLUTION INTRAMUSCULAR; INTRAVENOUS at 09:53

## 2025-07-16 RX ADMIN — KETOROLAC TROMETHAMINE 15 MG: 15 INJECTION, SOLUTION INTRAMUSCULAR; INTRAVENOUS at 17:52

## 2025-07-16 RX ADMIN — MIDAZOLAM HYDROCHLORIDE 2 MG: 1 INJECTION, SOLUTION INTRAMUSCULAR; INTRAVENOUS at 07:33

## 2025-07-16 SDOH — ECONOMIC STABILITY: TRANSPORTATION INSECURITY
IN THE PAST 12 MONTHS, HAS LACK OF RELIABLE TRANSPORTATION KEPT YOU FROM MEDICAL APPOINTMENTS, MEETINGS, WORK OR FROM GETTING THINGS NEEDED FOR DAILY LIVING?: PATIENT DECLINED

## 2025-07-16 SDOH — ECONOMIC STABILITY: TRANSPORTATION INSECURITY
IN THE PAST 12 MONTHS, HAS THE LACK OF TRANSPORTATION KEPT YOU FROM MEDICAL APPOINTMENTS OR FROM GETTING MEDICATIONS?: PATIENT DECLINED

## 2025-07-16 ASSESSMENT — COGNITIVE AND FUNCTIONAL STATUS - GENERAL
MOBILITY SCORE: 24
DAILY ACTIVITIY SCORE: 24
SUGGESTED CMS G CODE MODIFIER DAILY ACTIVITY: CH
SUGGESTED CMS G CODE MODIFIER MOBILITY: CH

## 2025-07-16 ASSESSMENT — SOCIAL DETERMINANTS OF HEALTH (SDOH)
WITHIN THE LAST YEAR, HAVE YOU BEEN KICKED, HIT, SLAPPED, OR OTHERWISE PHYSICALLY HURT BY YOUR PARTNER OR EX-PARTNER?: NO
WITHIN THE PAST 12 MONTHS, THE FOOD YOU BOUGHT JUST DIDN'T LAST AND YOU DIDN'T HAVE MONEY TO GET MORE: NEVER TRUE
WITHIN THE PAST 12 MONTHS, YOU WORRIED THAT YOUR FOOD WOULD RUN OUT BEFORE YOU GOT THE MONEY TO BUY MORE: NEVER TRUE
IN THE PAST 12 MONTHS, HAS THE ELECTRIC, GAS, OIL, OR WATER COMPANY THREATENED TO SHUT OFF SERVICE IN YOUR HOME?: NO
WITHIN THE LAST YEAR, HAVE YOU BEEN AFRAID OF YOUR PARTNER OR EX-PARTNER?: NO
WITHIN THE PAST 12 MONTHS, THE FOOD YOU BOUGHT JUST DIDN'T LAST AND YOU DIDN'T HAVE MONEY TO GET MORE: NEVER TRUE
IN THE PAST 12 MONTHS, HAS THE ELECTRIC, GAS, OIL, OR WATER COMPANY THREATENED TO SHUT OFF SERVICE IN YOUR HOME?: NO
WITHIN THE PAST 12 MONTHS, YOU WORRIED THAT YOUR FOOD WOULD RUN OUT BEFORE YOU GOT THE MONEY TO BUY MORE: NEVER TRUE
IN THE PAST 12 MONTHS, HAS THE ELECTRIC, GAS, OIL, OR WATER COMPANY THREATENED TO SHUT OFF SERVICE IN YOUR HOME?: NO
WITHIN THE LAST YEAR, HAVE TO BEEN RAPED OR FORCED TO HAVE ANY KIND OF SEXUAL ACTIVITY BY YOUR PARTNER OR EX-PARTNER?: NO
WITHIN THE PAST 12 MONTHS, THE FOOD YOU BOUGHT JUST DIDN'T LAST AND YOU DIDN'T HAVE MONEY TO GET MORE: NEVER TRUE
WITHIN THE LAST YEAR, HAVE YOU BEEN HUMILIATED OR EMOTIONALLY ABUSED IN OTHER WAYS BY YOUR PARTNER OR EX-PARTNER?: NO
WITHIN THE PAST 12 MONTHS, YOU WORRIED THAT YOUR FOOD WOULD RUN OUT BEFORE YOU GOT THE MONEY TO BUY MORE: NEVER TRUE

## 2025-07-16 ASSESSMENT — LIFESTYLE VARIABLES
EVER HAD A DRINK FIRST THING IN THE MORNING TO STEADY YOUR NERVES TO GET RID OF A HANGOVER: NO
HOW MANY TIMES IN THE PAST YEAR HAVE YOU HAD 5 OR MORE DRINKS IN A DAY: 0
CONSUMPTION TOTAL: NEGATIVE
TOTAL SCORE: 0
TOTAL SCORE: 0
DOES PATIENT WANT TO STOP DRINKING: CANNOT ASSESS
TOTAL SCORE: 0
HAVE YOU EVER FELT YOU SHOULD CUT DOWN ON YOUR DRINKING: NO
EVER FELT BAD OR GUILTY ABOUT YOUR DRINKING: NO
AVERAGE NUMBER OF DAYS PER WEEK YOU HAVE A DRINK CONTAINING ALCOHOL: 0
ON A TYPICAL DAY WHEN YOU DRINK ALCOHOL HOW MANY DRINKS DO YOU HAVE: 0
HAVE PEOPLE ANNOYED YOU BY CRITICIZING YOUR DRINKING: NO
ALCOHOL_USE: NO

## 2025-07-16 ASSESSMENT — PAIN DESCRIPTION - PAIN TYPE
TYPE: SURGICAL PAIN

## 2025-07-16 ASSESSMENT — FIBROSIS 4 INDEX: FIB4 SCORE: 1.43

## 2025-07-16 ASSESSMENT — PATIENT HEALTH QUESTIONNAIRE - PHQ9
SUM OF ALL RESPONSES TO PHQ9 QUESTIONS 1 AND 2: 0
2. FEELING DOWN, DEPRESSED, IRRITABLE, OR HOPELESS: NOT AT ALL
1. LITTLE INTEREST OR PLEASURE IN DOING THINGS: NOT AT ALL

## 2025-07-16 ASSESSMENT — PAIN SCALES - GENERAL: PAIN_LEVEL: 3

## 2025-07-16 NOTE — ANESTHESIA PROCEDURE NOTES
Airway    Date/Time: 7/16/2025 7:44 AM    Performed by: Mery Smart M.D.  Authorized by: Mery Smart M.D.    Location:  OR  Urgency:  Elective  Indications for Airway Management:  Anesthesia      Spontaneous Ventilation: absent    Sedation Level:  Deep  Preoxygenated: Yes    Patient Position:  Sniffing  Mask Difficulty Assessment:  2 - vent by mask + OA or adjuvant +/- NMBA  Final Airway Type:  Endotracheal airway  Final Endotracheal Airway:  ETT  Cuffed: Yes    Technique Used for Successful ETT Placement:  Direct laryngoscopy  Devices/Methods Used in Placement:  Intubating stylet    Insertion Site:  Oral  Blade Type:  Marlow  Laryngoscope Blade/Videolaryngoscope Blade Size:  2  ETT Size (mm):  7.0  Measured from:  Teeth  ETT to Teeth (cm):  20  Placement Verified by: auscultation and capnometry    Cormack-Lehane Classification:  Grade I - full view of glottis  Number of Attempts at Approach:  1

## 2025-07-16 NOTE — OR NURSING
1044 Pt arrived from OR to PACU bay ., report received. Connected to monitor, VSS. On 6L mask.  One incision closed with dermabond CDI. Patient awake and alert with complaints of pain or nausea.     1056 Pt tolerating clear liquids    1058 patient medicated per MAR with Tylenol     1103 patient son updated via telephone on patient status and POC     1131 April Report given to  Rn . All questions answered, verbalizes understanding. Patient transport request placed.   1152 Pt transported back to room via gurney with 2 Betterment, transport monitor, all personal belongings. Accompanied by patient transport.

## 2025-07-16 NOTE — H&P
"Assessment & Plan       64 yo M w/ prostate cancer who presents for robotic prostatectomy    Discussed the risk of surgery including bleeding and infection,  and the risks of general anesthetic including MI, CVA, sudden death or even reaction to anesthetic medications. The patient understands the risks, any and all questions were answered to the patient's satisfaction.      Alize Patricio is a 65 y.o. male who presents for treatment of his prostate cancer.    History Review:      Past Surgical History[1]    Past Medical History[2]    Family History   Problem Relation Age of Onset    Breast Cancer Mother     Cancer Mother         Breast cancer    Heart Disease Father     Hypertension Father     Kidney Disease Father     Cancer Father         skin cancer    Skin cancer Father     Obesity Sister     Cancer Brother         Prostate Cancer    Prostate cancer Brother     Bladder cancer Brother     Cancer Maternal Uncle         Prostate cancer    Prostate cancer Maternal Uncle     Cancer Maternal Uncle         Lung Cancer    Lung Cancer Maternal Uncle     Cancer Paternal Aunt         Brain tumors    Cancer Maternal Grandmother         Colon cancer    Colon Cancer Maternal Grandmother     Heart Attack Maternal Grandfather 45    No Known Problems Paternal Grandmother     Stroke Paternal Grandfather     No Known Problems Son     Prostate cancer Cousin     Stroke Other        Social History[3]    Review of Systems   All other systems reviewed and are negative.      Objective     MRI with PIRADS 3 lesion  PSA < 4  Biopsy with GG3  Decipher 0.90    BP (!) 174/96   Pulse 64   Temp 36.7 °C (98 °F) (Temporal)   Resp 16   Ht 1.702 m (5' 7\")   Wt 116 kg (256 lb 13.4 oz)   SpO2 96%   BMI 40.23 kg/m²     Patient has no known allergies.    Physical Exam  Vitals and nursing note reviewed.   HENT:      Head: Normocephalic.      Nose: Nose normal.      Mouth/Throat:      Pharynx: Oropharynx is clear.   Eyes:      " Conjunctiva/sclera: Conjunctivae normal.   Cardiovascular:      Rate and Rhythm: Normal rate.   Pulmonary:      Effort: Pulmonary effort is normal.   Abdominal:      Palpations: Abdomen is soft.   Musculoskeletal:         General: Normal range of motion.      Cervical back: Neck supple.   Skin:     General: Skin is warm.      Capillary Refill: Capillary refill takes less than 2 seconds.   Neurological:      General: No focal deficit present.      Mental Status: He is alert and oriented to person, place, and time.   Psychiatric:         Mood and Affect: Mood normal.                [1]   Past Surgical History:  Procedure Laterality Date    PROSTATE NEEDLE BIOPSY N/A 5/7/2025    Procedure: TRANSPERINEAL PROSTATE BIOPSY;  Surgeon: Merlin Garcia M.D.;  Location: SURGERY SAME DAY [facility];  Service: Urology    HERNIA REPAIR      VASECTOMY     [2]   Past Medical History:  Diagnosis Date    Cancer (HCC)     prostate    COVID-19 02/01/2021    Elevated hemoglobin A1c 01/05/2019    Gout     Hyperlipidemia     Hypertension     Obesity, morbid, BMI 40.0-49.9 (HCC)     Pneumonia     was hospitalized for this   [3]   Social History  Tobacco Use    Smoking status: Former     Types: Cigars    Smokeless tobacco: Former     Types: Chew     Quit date: 1/3/2012    Tobacco comments:     formerly smoked a few cigars per year   Vaping Use    Vaping status: Never Used   Substance Use Topics    Alcohol use: Not Currently     Alcohol/week: 3.6 oz     Comment: no Alcohol since July 2022    Drug use: Never

## 2025-07-16 NOTE — ANESTHESIA POSTPROCEDURE EVALUATION
Patient: Sheng Joel    Procedure Summary       Date: 07/16/25 Room / Location: [facility] ROOM 25 / SURGERY SAME DAY [facility]    Anesthesia Start: 0730 Anesthesia Stop: 1048    Procedure: ROBOTIC RADICAL PROSTATECTOMY (Prostate) Diagnosis: (PROSTATE CANCER)    Surgeons: Merlin Garcia M.D. Responsible Provider: Mery Smart M.D.    Anesthesia Type: general ASA Status: 3            Final Anesthesia Type: general  Last vitals  BP   Blood Pressure: (!) 155/73    Temp   36.9 °C (98.4 °F)    Pulse   82   Resp   12    SpO2   97 %      Anesthesia Post Evaluation    Patient location during evaluation: PACU  Patient participation: complete - patient participated  Level of consciousness: awake and alert  Pain score: 3    Airway patency: patent  Anesthetic complications: no  Cardiovascular status: hemodynamically stable  Respiratory status: acceptable  Hydration status: euvolemic    PONV: none        No notable events documented.     Nurse Pain Score: 0 (NPRS)

## 2025-07-16 NOTE — ANESTHESIA TIME REPORT
Anesthesia Start and Stop Event Times       Date Time Event    7/16/2025 0721 Ready for Procedure     0730 Anesthesia Start     1048 Anesthesia Stop          Responsible Staff  07/16/25      Name Role Begin End    Mery Smart M.D. Anesth 0730 1048          Overtime Reason:  no overtime (within assigned shift)    Comments:

## 2025-07-16 NOTE — DISCHARGE INSTRUCTIONS
Additional Instructions:     OK to shower normally.  No baths/pools/soaking x 2 weeks.  No strenuous activity x 1 week (walking OK, otherwise no physical exertion).  No lifting over 15 lbs x 4 weeks   OK to take oxycodone if needed for breakthru pain but otherwise use tylenol and ibuprofen  If taking oxycodone, please use miralax or other stool softener     Reasons for call and immediate return to ER:     Bright/dark blood in the urine (can be with clots / clot material).  Think red wine or tomato juice appearance.     Reasons for call:     Worsening pain  Fevers, chills  Nausea, vomiting  Wounds appear red and inflamed (concern for infection)          If any questions arise, call your provider.  If your provider is not available, please feel free to call the Surgical Center at [phone].    MEDICATIONS: Resume taking daily medication.  Take prescribed pain medication with food.  If no medication is prescribed, you may take non-aspirin pain medication if needed.  PAIN MEDICATION CAN BE VERY CONSTIPATING.  Take a stool softener or laxative such as senokot, pericolace, or milk of magnesia if needed.    Last pain medication given:     Tylenol at 11:00 AM.     Toradol (same class as Ibuprofen) at 9:53 AM.     What to Expect Post Anesthesia    Rest and take it easy for the first 24 hours.  A responsible adult is recommended to remain with you during that time.  It is normal to feel sleepy.  We encourage you to not do anything that requires balance, judgment or coordination.    FOR 24 HOURS DO NOT:  Drive, operate machinery or run household appliances.  Drink beer or alcoholic beverages.  Make important decisions or sign legal documents.    To avoid nausea, slowly advance diet as tolerated, avoiding spicy or greasy foods for the first day.  Add more substantial food to your diet according to your provider's instructions.  Babies can be fed formula or breast milk as soon as they are hungry.  INCREASE FLUIDS AND FIBER  TO AVOID CONSTIPATION.    MILD FLU-LIKE SYMPTOMS ARE NORMAL.  YOU MAY EXPERIENCE GENERALIZED MUSCLE ACHES, THROAT IRRITATION, HEADACHE AND/OR SOME NAUSEA.

## 2025-07-16 NOTE — ANESTHESIA PREPROCEDURE EVALUATION
Case: [id number] Date/Time: 07/16/25 0715    Procedure: ROBOTIC RADICAL PROSTATECTOMY    Pre-op diagnosis: PROSTATE CANCER    Location: [facility] ROOM 25 / SURGERY SAME DAY [facility]    Surgeons: Merlin Garcia M.D.            Relevant Problems   Other   (positive) Chronic idiopathic gout of multiple sites       Physical Exam    Airway   Mallampati: II  TM distance: >3 FB       Cardiovascular   Rhythm: regular     Dental    Pulmonary Breath sounds clear to auscultation     Abdominal (+) obese     Neurological - normal exam               Anesthesia Plan    ASA 3   ASA physical status 3 criteria: morbid obesity - BMI greater than or equal to 40    Plan - general       Airway plan will be ETT          Induction: intravenous    Postoperative Plan: Postoperative administration of opioids is intended.    Pertinent diagnostic labs and testing reviewed    Informed Consent:    Anesthetic plan and risks discussed with patient.    Use of blood products discussed with: whom consented to blood products.

## 2025-07-16 NOTE — OP REPORT
SURGEON: Dr. Merlin Garcia        PREOPERATIVE DIAGNOSIS: Prostate cancer     POSTOPERATIVE DIAGNOSIS: Prostate cancer     PROCEDURE PERFORMED: Robotic prostatectomy (extraperitoneal)     ANESTHESIA: General (general endotracheal tube)        FLUID: See anesthesia report      ESTIMATED BLOOD LOSS: 100 cc.      SPECIMENS REMOVED:   Prostate     TUBES, LINES AND DRAINS:    Valdes catheter to gravity     INDICATIONS FOR PROCEDURE: Sheng Joel is a 65 y.o. male with prostate cancer who presents for prostatectomy.  The patient understands the main risks to be infection, bleeding, damage to surrounding structures, hernia formation, need for additional surgeries or procedures.      DESCRIPTION OF PROCEDURE: Informed consent was obtained. The patient was properly identified and placed in supine position per OR protocol. The patient was given a prophylactic dose of ancef 2 grams. General (general endotracheal tube) was administered. The patient was then prepped and draped in the standard sterile fashion. All the pressure points were confirmed to be padded. A time-out was then conducted with all parties in agreement.        A 2.7 cm lower midline incision was made.  We dissected down to the level of the fascia, which was opened sharply.  We identified the extraperitoneal space and digitally dissected the space of Retzius.  The access guide was placed.  The SP robot was docked.  The instruments were advanced.  The space of Retzius was developed.  The bladder neck was incised and the catheter was exposed.  With the catheter retracted vertically, the posterior bladder neck was divided until we reached the seminal vesicles and vas.  These structures were dissected with combination of blunt and electrocautery dissection.  We then worked posterolaterally to dissect the nerve-bundles free while dissecting the prostate medially.  The dissection was taken up and around the lateral fascia and the anterior fibromuscular stroma in a  'gonzalez' sparing fashion.  The DVC was controlled at the level of the bladder neck.  The urethra was then dissected free of the apex and divided.  The specimen was placed off the field.  The catheter was withdrawn.  A 2-0 vicryl suture was used to re-approximate the posterior bladder and periurethral tissue.  The vesicourethral anastomosis was then completed with running 3-0 v lock suture.  A new catheter was placed and secured with 10 mL of NS.  The bladder was irrigated with no evidence of leak.  The specimen was brought into the access guide.  The robot was undocked.  The specimen was removed from this location.  The fascia was closed with 0 vicryl suture.  The skin of all incision site was reapproximated with 4-0 monocryl suture.  The skin was then brought together with Dermabond.  The patient was awoken and taken to post-operative anesthesia recovery.     DISPOSITION: The patient will be admitted overnight for observation.  He will follow-up in 7-10 days for Valdes removal.

## 2025-07-17 NOTE — PROGRESS NOTES
1930: Received bedside report from OPAL Santizo at this time. Assumed care of pt. Call light in reach. Bed in locked and lowest position. Plan of care discussed with pt. Pt agreeing to plan of care. Communication board updated. All questions answered. Assessment completed.     2000: Pt discharging home at this time. PIV removed. All discharge education discussed with the pt. Pt verbalizes understanding of discharge education. Pt has no questions or concerns at this time. Pt provided leg bags upon discharge for his mays catheter. Pt escorted by OPAL Mendiola to his son's vehicle. All belongings with pt upon discharge.

## 2025-07-17 NOTE — DISCHARGE SUMMARY
Discharge Summary    Reason for Admission  Prostate cancer (HCC)     Admission Date  7/16/2025    CODE STATUS  Full Code    HPI & HOSPITAL COURSE  This is a 65 y.o. male here for treatment of his prostate cancer    Therefore, he is discharged in good and stable condition to home with close outpatient follow-up.    The patient recovered much more quickly than anticipated on admission.    Discharge Date  7/16/2025    FOLLOW UP ITEMS POST DISCHARGE  Mays removal on 7/23/2025    DISCHARGE DIAGNOSES  Principal Problem:    Prostate cancer (HCC) (POA: Yes)  Resolved Problems:    * No resolved hospital problems. *      FOLLOW UP  Future Appointments   Date Time Provider Department Center   8/22/2025 10:15 AM Merlin Garcia M.D. [facility] None     Merlin Garcia M.D.  [address] [address]  [phone]    Call  in 7-10 days for mays removal      MEDICATIONS ON DISCHARGE     Medication List        START taking these medications        Instructions   oxyCODONE immediate-release 5 MG Tabs  Commonly known as: Roxicodone   Take 1 Tablet by mouth every 6 hours as needed for Severe Pain for up to 5 days.  Dose: 5 mg     senna-docusate 8.6-50 MG Tabs  Commonly known as: Pericolace Or Senokot S   Take 1 Tablet by mouth every day.  Dose: 1 Tablet            CONTINUE taking these medications        Instructions   allopurinol 100 MG Tabs  Commonly known as: Zyloprim   Take 1 tablet by mouth once daily  Dose: 100 mg     diphenhydrAMINE 25 MG Tabs  Commonly known as: Benadryl   Take 25 mg by mouth every 6 hours as needed for Sleep.  Dose: 25 mg     INDOMETHACIN PO   Take  by mouth as needed. Gout flare     Multi For Him Caps   Take  by mouth.     vitamin D2 1.25 MG (82933 UT) Caps capsule  Commonly known as: Ergocalciferol   Take 1 capsule by mouth once a week  Dose: 50,000 Units              Allergies  Allergies[1]    DIET  Orders Placed This Encounter   Procedures    Diet Order Diet: Regular     Standing Status:    Standing     Number of Occurrences:   1     Diet::   Regular [1]       ACTIVITY  As tolerated.  15-lb lifting restriction x 4 weeks    CONSULTATIONS  None    PROCEDURES  Robotic prostatectomy    LABORATORY  Lab Results   Component Value Date    SODIUM 142 07/03/2025    POTASSIUM 4.3 07/03/2025    CHLORIDE 108 07/03/2025    CO2 22 07/03/2025    GLUCOSE 93 07/03/2025    BUN 19 07/03/2025    CREATININE 1.24 07/03/2025    CREATININE 1.2 12/10/2007        Lab Results   Component Value Date    WBC 7.0 07/03/2025    HEMOGLOBIN 14.0 07/03/2025    HEMATOCRIT 41.7 (L) 07/03/2025    PLATELETCT 266 07/03/2025        Total time of the discharge process exceeds <30 minutes.       [1] No Known Allergies

## 2025-07-18 ENCOUNTER — OFFICE VISIT (OUTPATIENT)
Dept: UROLOGY | Facility: MEDICAL CENTER | Age: 65
End: 2025-07-18
Payer: COMMERCIAL

## 2025-07-18 DIAGNOSIS — C61 PROSTATE CANCER (HCC): Primary | ICD-10-CM

## 2025-07-18 PROCEDURE — 99024 POSTOP FOLLOW-UP VISIT: CPT | Performed by: UROLOGY

## 2025-07-18 NOTE — PROGRESS NOTES
Chief Complaint: prostate cancer    History of Present Illness:    Sheng Joel is a 65 y.o. male who presents today for catheter issues  - s/p RALP 2 days ago  - Doing very well discharged POD#0  - Now with catheter not draining    Subjective    Family History   Problem Relation Age of Onset    Breast Cancer Mother     Cancer Mother         Breast cancer    Heart Disease Father     Hypertension Father     Kidney Disease Father     Cancer Father         skin cancer    Skin cancer Father     Obesity Sister     Cancer Brother         Prostate Cancer    Prostate cancer Brother     Bladder cancer Brother     Cancer Maternal Uncle         Prostate cancer    Prostate cancer Maternal Uncle     Cancer Maternal Uncle         Lung Cancer    Lung Cancer Maternal Uncle     Cancer Paternal Aunt         Brain tumors    Cancer Maternal Grandmother         Colon cancer    Colon Cancer Maternal Grandmother     Heart Attack Maternal Grandfather 45    No Known Problems Paternal Grandmother     Stroke Paternal Grandfather     No Known Problems Son     Prostate cancer Cousin     Stroke Other      Social History     Socioeconomic History    Marital status:      Spouse name: Not on file    Number of children: 1    Years of education: Not on file    Highest education level: Bachelor's degree (e.g., BA, AB, BS)   Occupational History     Employer: [employer]   Tobacco Use    Smoking status: Former     Types: Cigars    Smokeless tobacco: Former     Types: Chew     Quit date: 1/3/2012    Tobacco comments:     formerly smoked a few cigars per year   Vaping Use    Vaping status: Never Used   Substance and Sexual Activity    Alcohol use: Not Currently     Alcohol/week: 3.6 oz     Comment: no Alcohol since July 2022    Drug use: Never    Sexual activity: Yes     Partners: Female     Birth control/protection: Surgical, Condom Male   Other Topics Concern    Not on file   Social History Narrative    Lives in [place] with self. 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      Social Drivers of Health     Financial Resource Strain: Low Risk  (9/9/2024)    Overall Financial Resource Strain (CARDIA)     Difficulty of Paying Living Expenses: Not very hard   Food Insecurity: No Food Insecurity (7/16/2025)    Hunger Vital Sign     Worried About Running Out of Food in the Last Year: Never true     Ran Out of Food in the Last Year: Never true   Transportation Needs: No Transportation Needs (7/16/2025)    PRAPARE - Transportation     Lack of Transportation (Medical): No     Lack of Transportation (Non-Medical): No   Physical Activity: Unknown (9/9/2024)    Exercise Vital Sign     Days of Exercise per Week: Not on file     Minutes of Exercise per Session: 150+ min   Stress: No Stress Concern Present (9/9/2024)    [employer] [place] [employer] - Occupational Stress Questionnaire     Feeling of Stress : Only a little   Social Connections: Moderately Isolated (9/9/2024)    Social Connection and Isolation Panel [NHANES]     Frequency of Communication with Friends and Family: More than three times a week     Frequency of Social Gatherings with Friends and Family: Once a week     Attends Tenriism Services: 1 to 4 times per year     Active Member of Clubs or Organizations: No     Attends Club or Organization Meetings: Never     Marital Status:    Intimate Partner Violence: Not on file   Housing Stability: Low Risk  (7/16/2025)    Housing Stability Vital Sign     Unable to Pay for Housing in the Last Year: No     Number of Times Moved in the Last Year: 0     Homeless in the Last Year: No     Past Surgical History[1]  Past Medical History[2]  Current Medications[3]  Allergies[4]    Review of systems was conducted and was negative except for as explicitly listed in the History of Present Illness.       Assessment & Plan    It was a pleasure speaking with Sheng Joel today.    Sheng Joel is a 65 y.o. male w/ catheter  issue after RALP  - Team able to flush the catheter, flowing well now  - Taught patient how to self-flush if needed  - Discussed pathology, recommend PSA prior to next visit         [1]   Past Surgical History:  Procedure Laterality Date    AR LAP,PROSTATECTOMY,RADICAL,W/NERVE SPARE N/A 7/16/2025    Procedure: ROBOTIC RADICAL PROSTATECTOMY;  Surgeon: Merlin Garcia M.D.;  Location: SURGERY SAME DAY [facility];  Service: Uro Robotic    PROSTATE NEEDLE BIOPSY N/A 5/7/2025    Procedure: TRANSPERINEAL PROSTATE BIOPSY;  Surgeon: Merlin Garcia M.D.;  Location: SURGERY SAME DAY [facility];  Service: Urology    HERNIA REPAIR      VASECTOMY     [2]   Past Medical History:  Diagnosis Date    Cancer (HCC)     prostate    COVID-19 02/01/2021    Elevated hemoglobin A1c 01/05/2019    Gout     Hyperlipidemia     Hypertension     Obesity, morbid, BMI 40.0-49.9 (HCC)     Pneumonia     was hospitalized for this   [3]   Current Outpatient Medications   Medication Sig Dispense Refill    oxyCODONE immediate-release (ROXICODONE) 5 MG Tab Take 1 Tablet by mouth every 6 hours as needed for Severe Pain for up to 5 days. 10 Tablet 0    senna-docusate (PERICOLACE OR SENOKOT S) 8.6-50 MG Tab Take 1 Tablet by mouth every day. 15 Tablet 0    allopurinol (ZYLOPRIM) 100 MG Tab Take 1 tablet by mouth once daily 90 Tablet 1    vitamin D2 (ERGOCALCIFEROL) 1.25 MG (66797 UT) Cap capsule Take 1 capsule by mouth once a week 12 Capsule 1    diphenhydrAMINE (BENADRYL) 25 MG Tab Take 25 mg by mouth every 6 hours as needed for Sleep.      INDOMETHACIN PO Take  by mouth as needed. Gout flare      Multiple Vitamins-Minerals (MULTI FOR HIM) Cap Take  by mouth.       No current facility-administered medications for this visit.   [4] No Known Allergies

## 2025-07-23 ENCOUNTER — OFFICE VISIT (OUTPATIENT)
Dept: UROLOGY | Facility: MEDICAL CENTER | Age: 65
End: 2025-07-23
Payer: COMMERCIAL

## 2025-07-23 DIAGNOSIS — C61 PROSTATE CANCER (HCC): Primary | ICD-10-CM

## 2025-07-23 PROCEDURE — 99024 POSTOP FOLLOW-UP VISIT: CPT | Performed by: STUDENT IN AN ORGANIZED HEALTH CARE EDUCATION/TRAINING PROGRAM

## 2025-07-23 NOTE — PROGRESS NOTES
Subjective  CHIEF COMPLAINT:    Patient presents to the office today to discuss:  1. Postoperative follow-up and catheter removal    PAST UROLOGICAL HISTORY:    The man underwent a recent robotic radical prostatectomy and had a catheter placed intraoperatively. He presented to the office on a Friday with concerns about bruising around the incision site, which was evaluated for potential infection. The area was marked to monitor for any spread of redness. The bruising has since improved with no redness or drainage.    HPI TODAY 07/23/2025:    - Postoperative follow-up after a recent robotic procedure. Catheter was removed today in the office.  - Reports abdominal muscle soreness, particularly with movements like getting out of bed, but this is improving.    Family History   Problem Relation Age of Onset    Breast Cancer Mother     Cancer Mother         Breast cancer    Heart Disease Father     Hypertension Father     Kidney Disease Father     Cancer Father         skin cancer    Skin cancer Father     Obesity Sister     Cancer Brother         Prostate Cancer    Prostate cancer Brother     Bladder cancer Brother     Cancer Maternal Uncle         Prostate cancer    Prostate cancer Maternal Uncle     Cancer Maternal Uncle         Lung Cancer    Lung Cancer Maternal Uncle     Cancer Paternal Aunt         Brain tumors    Cancer Maternal Grandmother         Colon cancer    Colon Cancer Maternal Grandmother     Heart Attack Maternal Grandfather 45    No Known Problems Paternal Grandmother     Stroke Paternal Grandfather     No Known Problems Son     Prostate cancer Cousin     Stroke Other        Social History     Socioeconomic History    Marital status:      Spouse name: Not on file    Number of children: 1    Years of education: Not on file    Highest education level: Bachelor's degree (e.g., BA, AB, BS)   Occupational History     Employer: [employer]   Tobacco Use    Smoking status: Former     Types: Cigars     Smokeless tobacco: Former     Types: Chew     Quit date: 1/3/2012    Tobacco comments:     formerly smoked a few cigars per year   Vaping Use    Vaping status: Never Used   Substance and Sexual Activity    Alcohol use: Not Currently     Alcohol/week: 3.6 oz     Comment: no Alcohol since July 2022    Drug use: Never    Sexual activity: Yes     Partners: Female     Birth control/protection: Surgical, Condom Male   Other Topics Concern    Not on file   Social History Narrative    Lives in [place] with self. Retired , took job at [employer].      Social Drivers of Health     Financial Resource Strain: Low Risk  (9/9/2024)    Overall Financial Resource Strain (CARDIA)     Difficulty of Paying Living Expenses: Not very hard   Food Insecurity: No Food Insecurity (7/16/2025)    Hunger Vital Sign     Worried About Running Out of Food in the Last Year: Never true     Ran Out of Food in the Last Year: Never true   Transportation Needs: No Transportation Needs (7/16/2025)    PRAPARE - Transportation     Lack of Transportation (Medical): No     Lack of Transportation (Non-Medical): No   Physical Activity: Unknown (9/9/2024)    Exercise Vital Sign     Days of Exercise per Week: Not on file     Minutes of Exercise per Session: 150+ min   Stress: No Stress Concern Present (9/9/2024)    [place] [employer] - Occupational Stress Questionnaire     Feeling of Stress : Only a little   Social Connections: Moderately Isolated (9/9/2024)    Social Connection and Isolation Panel [NHANES]     Frequency of Communication with Friends and Family: More than three times a week     Frequency of Social Gatherings with Friends and Family: Once a week     Attends Latter-day Services: 1 to 4 times per year     Active Member of Clubs or Organizations: No     Attends Club or Organization Meetings: Never     Marital Status:    Intimate Partner Violence: Not on file   Housing Stability: Low Risk  (7/16/2025)     Housing Stability Vital Sign     Unable to Pay for Housing in the Last Year: No     Number of Times Moved in the Last Year: 0     Homeless in the Last Year: No       Past Surgical History:   Procedure Laterality Date    [place] LAP,PROSTATECTOMY,RADICAL,W/NERVE SPARE N/A 7/16/2025    Procedure: ROBOTIC RADICAL PROSTATECTOMY;  Surgeon: Merlin Garcia M.D.;  Location: SURGERY SAME DAY [facility];  Service: Uro Robotic    PROSTATE NEEDLE BIOPSY N/A 5/7/2025    Procedure: TRANSPERINEAL PROSTATE BIOPSY;  Surgeon: Merlin Garcia M.D.;  Location: SURGERY SAME DAY [facility];  Service: Urology    HERNIA REPAIR      VASECTOMY         Past Medical History:   Diagnosis Date    Cancer (HCC)     prostate    COVID-19 02/01/2021    Elevated hemoglobin A1c 01/05/2019    Gout     Hyperlipidemia     Hypertension     Obesity, morbid, BMI 40.0-49.9 (HCC)     Pneumonia     was hospitalized for this       Current Outpatient Medications   Medication Sig Dispense Refill    senna-docusate (PERICOLACE OR SENOKOT S) 8.6-50 MG Tab Take 1 Tablet by mouth every day. 15 Tablet 0    allopurinol (ZYLOPRIM) 100 MG Tab Take 1 tablet by mouth once daily 90 Tablet 1    vitamin D2 (ERGOCALCIFEROL) 1.25 MG (16862 UT) Cap capsule Take 1 capsule by mouth once a week 12 Capsule 1    diphenhydrAMINE (BENADRYL) 25 MG Tab Take 25 mg by mouth every 6 hours as needed for Sleep.      INDOMETHACIN PO Take  by mouth as needed. Gout flare      Multiple Vitamins-Minerals (MULTI FOR HIM) Cap Take  by mouth.       No current facility-administered medications for this visit.       No Known Allergies    Objective  There were no vitals taken for this visit.  Physical Exam  Constitutional:       Appearance: Normal appearance.   Pulmonary:      Effort: Pulmonary effort is normal.   Abdominal:      General: Abdomen is flat. There is no distension.      Palpations: Abdomen is soft.      Tenderness: There is no abdominal tenderness.      Comments: Examination of the abdomen reveals resolving  bruising at the surgical site with no signs of infection. Incisions c/d/i   Skin:     General: Skin is warm and dry.   Neurological:      General: No focal deficit present.      Mental Status: He is alert.   Psychiatric:         Mood and Affect: Mood normal.         Behavior: Behavior normal.         Labs: none    Imaging: none    Assessment    ASSESSMENT AND PLAN:    This gentleman is seen for a postoperative follow-up after a recent robotic radical prostatectomy procedure. He is recovering well, and his catheter was removed today.     1. Postoperative status following robotic radical prostatectomy (Z98.890)  - Assessment: Recovery is progressing well. Surgical incisions are clean, dry, and intact with resolving bruising.  - Plan: Incision glue can be gently removed in the shower once it loosens. Follow up scheduled in one month with PSA  - Counseling: Discussed expected postoperative incontinence and the use of pads or briefs as needed.     Plan    Problem List Items Addressed This Visit    None  ORDERS:    Order for PSA blood test to be completed one to two days prior to the next appointment.    FOLLOW UP:    Follow-up appointment is scheduled with Dr. Garcia to review his post-op PSA. He will be moving to [place] and plans to travel back for the appointment and will have his blood work done at a [facility] lab the day before.    SHORT SUMMARY:    This gentleman is seen for a postoperative visit. His catheter was removed, and his incisions are healing well. He was counseled on expected temporary incontinence and will follow up in a few weeks after obtaining pre-visit lab work.

## (undated) DEVICE — SODIUM CHL IRRIGATION 0.9% 1000ML (12EA/CA)

## (undated) DEVICE — KIT  I.V. START (100EA/CA)

## (undated) DEVICE — PACK DAVINCI GENERAL (3EA/CA)

## (undated) DEVICE — DRAPE INSTRUMENT ARM SP (1EA)

## (undated) DEVICE — Device

## (undated) DEVICE — SYRINGE 50ML CATHETER TIP (40EA/BX 4BX/CA)

## (undated) DEVICE — WATER IRRIGATION STERILE 1000ML (12EA/CA)

## (undated) DEVICE — MEDICINE CUP STERILE 2 OZ (100/CA)

## (undated) DEVICE — GLOVE SZ 7.5 BIOGEL PI MICRO - PF LF (50PR/BX)

## (undated) DEVICE — CANISTER SUCTION RIGID RED 1500CC (40EA/CA)

## (undated) DEVICE — GOWN WARMING STANDARD FLEX - (30/CA)

## (undated) DEVICE — LACTATED RINGERS INJ 1000 ML - (14EA/CA 60CA/PF)

## (undated) DEVICE — SYRINGE 50 ML LL (40EA/BX 4BX/CA)

## (undated) DEVICE — COVER LIGHT HANDLE FLEXIBLE - SOFT (2EA/PK 80PK/CA)

## (undated) DEVICE — TUBE CONNECTING SUCTION - CLEAR PLASTIC STERILE 72 IN (50EA/CA)

## (undated) DEVICE — SET LEADWIRE 5 LEAD BEDSIDE DISPOSABLE ECG (1SET OF 5/EA)

## (undated) DEVICE — SPONGE GAUZESTER 4 X 4 4PLY - (128PK/CA)

## (undated) DEVICE — SENSOR OXIMETER ADULT SPO2 RD SET (20EA/BX)

## (undated) DEVICE — SUCTION INSTRUMENT YANKAUER BULBOUS TIP W/O VENT (50EA/CA)

## (undated) DEVICE — TOWEL STOP TIMEOUT SAFETY FLAG (40EA/CA)

## (undated) DEVICE — MAT PATIENT POSITIONING PREVALON (10EA/CA)

## (undated) DEVICE — GOWN SURGICAL X-LARGE ULTRA - FILM-REINFORCED (20/CA)

## (undated) DEVICE — SYRINGE 20 ML LL (50EA/BX 4BX/CA)

## (undated) DEVICE — JELLY SURGILUBE STERILE TUBE 4.25 OZ (1/EA)

## (undated) DEVICE — CANNULA O2 COMFORT SOFT EAR ADULT 7 FT TUBING (50/CA)

## (undated) DEVICE — DRAPE MAYO STAND - (30/CA)

## (undated) DEVICE — TRAY CATHETER FOLEY URINE METER W/STATLOCK 350ML (10EA/CA)

## (undated) DEVICE — SUTURE 0 SILK 12 X 18 (36PK/BX)

## (undated) DEVICE — TUBING CLEARLINK DUO-VENT - C-FLO (48EA/CA)

## (undated) DEVICE — SUTURE 0 VICRYL PLUS UR-6 - 27 INCH (36/BX)

## (undated) DEVICE — GLOVE BIOGEL PI INDICATOR SZ 7.5 SURGICAL PF LF -(50/BX 4BX/CA)

## (undated) DEVICE — SYRINGE 10 ML CONTROL LL (25EA/BX 4BX/CA)

## (undated) DEVICE — NEEDLE BIOPSY SOFT TISSUE ACHIEVE 18GA X 9CM ORANGE BEVELED TIP (5EA/BX)

## (undated) DEVICE — CATHETER IV 14 GA X 2 ---SURG.& SDS ONLY---(200EA/CA)

## (undated) DEVICE — PEN SKIN MARKER W/RULER - (50EA/BX)

## (undated) DEVICE — CATHETER URETHRAL FOLEY SILICONE OD16 FR 10 ML (10EA/CA)

## (undated) DEVICE — GOWN SURGEONS LARGE - (32/CA)

## (undated) DEVICE — DERMABOND ADVANCED - (12EA/BX)

## (undated) DEVICE — CANISTER SUCTION 3000ML MECHANICAL FILTER AUTO SHUTOFF MEDI-VAC NONSTERILE LF DISP (40EA/CA)

## (undated) DEVICE — BALLOON ENDOCAVITY 14CM X 2CM (10EA/BX)

## (undated) DEVICE — ABSORBABLE TAPER POINT GREEN CLOSURE DEVICE V-LOC 180 3-0 CV-23 6IN (12EA/CA)

## (undated) DEVICE — NEEDLE SPNL NON-SAFETY 5IN 22GA BD STRL (10/BX)

## (undated) DEVICE — GLOVE SZ 6.5 BIOGEL PI MICRO - PF LF (50PR/BX)

## (undated) DEVICE — NEEDLE INSFL 120MM 14GA VRRS - (20/BX)

## (undated) DEVICE — MASK OXYGEN VNYL ADLT MED CONC WITH 7 FOOT TUBING - (50EA/CA)

## (undated) DEVICE — DRESSING NON-ADHERING 8 X 3 - (50/BX)

## (undated) DEVICE — SLEEVE VASO DVT COMPRESSION CALF MED - (10PR/CA)

## (undated) DEVICE — SUTURE 4-0 MONOCRYL PLUS PS-1 - 27 INCH (36/BX)

## (undated) DEVICE — ANTI-FOG SOLUTION - 60BTL/CA

## (undated) DEVICE — SUTURE 2-0 VICRYL PLUS SH - 27 INCH (36/BX)